# Patient Record
Sex: FEMALE | Race: WHITE | URBAN - METROPOLITAN AREA
[De-identification: names, ages, dates, MRNs, and addresses within clinical notes are randomized per-mention and may not be internally consistent; named-entity substitution may affect disease eponyms.]

---

## 2017-01-12 PROBLEM — E66.09 NON MORBID OBESITY DUE TO EXCESS CALORIES: Chronic | Status: ACTIVE | Noted: 2017-01-12

## 2017-01-16 ENCOUNTER — OFFICE VISIT (OUTPATIENT)
Dept: SLEEP MEDICINE | Facility: CLINIC | Age: 52
End: 2017-01-16
Payer: COMMERCIAL

## 2017-01-16 VITALS
TEMPERATURE: 98.6 F | SYSTOLIC BLOOD PRESSURE: 150 MMHG | HEART RATE: 104 BPM | WEIGHT: 231.6 LBS | OXYGEN SATURATION: 97 % | HEIGHT: 66 IN | DIASTOLIC BLOOD PRESSURE: 100 MMHG | BODY MASS INDEX: 37.22 KG/M2

## 2017-01-16 DIAGNOSIS — G47.9 DISTURBANCE IN SLEEP BEHAVIOR: ICD-10-CM

## 2017-01-16 DIAGNOSIS — R06.00 DYSPNEA AND RESPIRATORY ABNORMALITY: Primary | ICD-10-CM

## 2017-01-16 DIAGNOSIS — F51.04 PSYCHOPHYSIOLOGICAL INSOMNIA: ICD-10-CM

## 2017-01-16 DIAGNOSIS — R06.89 DYSPNEA AND RESPIRATORY ABNORMALITY: Primary | ICD-10-CM

## 2017-01-16 DIAGNOSIS — E66.09 NON MORBID OBESITY DUE TO EXCESS CALORIES: Chronic | ICD-10-CM

## 2017-01-16 DIAGNOSIS — R03.0 ELEVATED BLOOD PRESSURE READING WITHOUT DIAGNOSIS OF HYPERTENSION: ICD-10-CM

## 2017-01-16 PROCEDURE — 99204 OFFICE O/P NEW MOD 45 MIN: CPT | Performed by: INTERNAL MEDICINE

## 2017-01-16 RX ORDER — ZOLPIDEM TARTRATE 5 MG/1
TABLET ORAL
Qty: 1 TABLET | Refills: 0 | Status: SHIPPED | OUTPATIENT
Start: 2017-01-16 | End: 2017-02-08

## 2017-01-16 NOTE — PATIENT INSTRUCTIONS
Read the book Say Good Night To Insomnia     Your BMI is Body mass index is 37.4 kg/(m^2).  Weight management is a personal decision.  If you are interested in exploring weight loss strategies, the following discussion covers the approaches that may be successful. Body mass index (BMI) is one way to tell whether you are at a healthy weight, overweight, or obese. It measures your weight in relation to your height.  A BMI of 18.5 to 24.9 is in the healthy range. A person with a BMI of 25 to 29.9 is considered overweight, and someone with a BMI of 30 or greater is considered obese. More than two-thirds of American adults are considered overweight or obese.  Being overweight or obese increases the risk for further weight gain. Excess weight may lead to heart disease and diabetes.  Creating and following plans for healthy eating and physical activity may help you improve your health.  Weight control is part of healthy lifestyle and includes exercise, emotional health, and healthy eating habits. Careful eating habits lifelong are the mainstay of weight control. Though there are significant health benefits from weight loss, long-term weight loss with diet alone may be very difficult to achieve- studies show long-term success with dietary management in less than 10% of people. Attaining a healthy weight may be especially difficult to achieve in those with severe obesity. In some cases, medications, devices and surgical management might be considered.  What can you do?  If you are overweight or obese and are interested in methods for weight loss, you should discuss this with your provider.     Consider reducing daily calorie intake by 500 calories.     Keep a food journal.     Avoiding skipping meals, consider cutting portions instead.    Diet combined with exercise helps maintain muscle while optimizing fat loss. Strength training is particularly important for building and maintaining muscle mass. Exercise helps reduce  stress, increase energy, and improves fitness. Increasing exercise without diet control, however, may not burn enough calories to loose weight.       Start walking three days a week 10-20 minutes at a time    Work towards walking thirty minutes five days a week     Eventually, increase the speed of your walking for 1-2 minutes at time    In addition, we recommend that you review healthy lifestyles and methods for weight loss available through the National Institutes of Health patient information sites:  http://win.niddk.nih.gov/publications/index.htm    And look into health and wellness programs that may be available through your health insurance provider, employer, local community center, or myrna club.    Weight management plan: Patient was referred to their PCP to discuss a diet and exercise plan.

## 2017-01-16 NOTE — PROGRESS NOTES
Sleep Consultation:    Date on this visit: 1/16/2017    Mckenna Campbell  is referred by No ref. provider found for a sleep consultation.     Primary Physician: Linda Minaya     Chief Complaint   Patient presents with     Snoring     To see if I have sleep apnea , because I snore loud and it bothers my  and son        Mckenna goes to bed at 8:00 PM during the week. She gets up at 6:00 AM without an alarm. She falls asleep in 60 minutes.  Mckenna has difficulty falling asleep. This has been a problem for 4 years since she started working at [employer]. She takes melatonin.  She reads on nir. She is wide awake. She admits to troubles turning her mind off. She wakes up 2-3 times a night. She has trouble falling back to sleep once a night. She will again read. Mckenna wakes up to uncertain reasons and dreams.  On weekends, schedule is similar.  Patient gets an average of '4'  hours of sleep per night.     Patient does not watch TV in bed.     Mckenna does snore snoring is very loud. Patient does have a regular bed partner. She does have witnessed apneas. They occasionally sleep separately.  Patient sleeps on her side. She has occasional snort arousals, denies no morning headaches or restless legs.    Mckenna denies any sleep walking, sleep talking, dream enactment, sleep paralysis, cataplexy and hypnogogic/hypnopompic hallucinations.    Mckenna denies difficulty breathing through her nose.      Patient describes themself as a morning person. Patient's Raymond Sleepiness score 5/24 inconsistent with excessive daytime sleepiness.  She denies fatigue.     Mckenna naps 1 times per day for 60 minutes. She takes no inadvertant naps.  She denies dozing while driving. She uses 2-3 sodas/day. Last caffeine intake is usually before 6 pm.      Allergies:    Allergies   Allergen Reactions     Amoxicillin Anaphylaxis     Amoxicillin      Erythromycin      GI intolerance         Medications:    Current Outpatient Prescriptions    Medication Sig Dispense Refill     PRISTIQ 100 MG TB24 24 hr tablet        fenofibrate 160 MG tablet        VOLTAREN 1 % GEL        megestrol (MEGACE) 20 MG tablet        order for DME Equipment being ordered: tennis elbow strap 1 Device 0     diclofenac (VOLTAREN) 1 % GEL Apply 4 grams to knees or 2 grams to hands four times daily using enclosed dosing card. 100 g 1     cetirizine-psuedoePHEDrine (ZYRTEC-D) 5-120 MG per tablet Take 1 tablet by mouth 2 times daily 30 tablet 0     diclofenac (VOLTAREN) 1 % GEL Apply 4 grams to knees or 2 grams to hands four times daily using enclosed dosing card. 100 g 1     ibuprofen (ADVIL,MOTRIN) 200 MG tablet Take 2-3 tablets (400-600 mg) by mouth 3 times daily as needed for pain (take with food)       fenofibrate (TRIGLIDE) 160 MG tablet Take 160 mg by mouth daily. 1 tab QD         desvenlafaxine (PRISTIQ) 50 MG 24 hr tablet Take 50 mg by mouth daily. 1 tab QD         Problem List:  Patient Active Problem List    Diagnosis Date Noted     Elevated blood pressure reading without diagnosis of hypertension 03/30/2016     Priority: Medium     Hyperlipidemia LDL goal <130 02/10/2014     Priority: Medium     Non morbid obesity due to excess calories 01/12/2017     Priority: Low     CARDIOVASCULAR SCREENING; LDL GOAL LESS THAN 160 01/27/2016     Priority: Low     DJD (degenerative joint disease), lumbar 02/10/2014     Priority: Low        Past Medical/Surgical History:  Past Medical History   Diagnosis Date     Kidney stone 9/2015     Past Surgical History   Procedure Laterality Date     Cholecystectomy  3/2014     Lithotripsy  9/2015       Social History:  Social History     Social History     Marital Status:      Spouse Name: N/A     Number of Children: N/A     Years of Education: N/A     Occupational History     Customer service Unemployed     Social History Main Topics     Smoking status: Never Smoker      Smokeless tobacco: Not on file     Alcohol Use: 0.0 oz/week     0  "Standard drinks or equivalent per week      Comment: 1/year     Drug Use: No     Sexual Activity:     Partners: Male     Other Topics Concern     Not on file     Social History Narrative    ** Merged History Encounter **            Family History:  Family History   Problem Relation Age of Onset     Depression Mother      Hyperlipidemia Father      Hypertension Mother      Breast Cancer Other      Other Cancer Other      DIABETES No family hx of      Coronary Artery Disease No family hx of      Colon Cancer No family hx of        Review of Systems:  A complete review of systems reviewed by me is negative with the exeption of what has been mentioned in the history of present illness.  CONSTITUTIONAL:  NEGATIVE for  night sweats  EYES: NEGATIVE for changes in vision, blind spots, double vision.  ENT: NEGATIVE for ear pain, sore throat, sinus pain, post-nasal drip, runny nose, bloody nose  CARDIAC: NEGATIVE for fast heartbeats or fluttering in chest, chest pain or pressure, breathlessness when lying flat, swollen legs or swollen feet.  NEUROLOGIC: NEGATIVE headaches, weakness or numbness in the arms or legs.  DERMATOLOGIC: NEGATIVE for rashes, new moles or change in mole(s)  PULMONARY:  POSITIVE for  SOB with activity and dry cough  GASTROINTESTINAL: NEGATIVE for nausea or vomitting, loose or watery stools, fat or grease in stools, constipation, abdominal pain, bowel movements black in color or blood noted.  GENITOURINARY: NEGATIVE for pain during urination, blood in urine, urinating more frequently than usual, irregular menstrual periods.  MUSCULOSKELETAL:  POSITIVE for  bone or joint pain  ENDOCRINE: NEGATIVE for increased thirst or urination, diabetes.  LYMPHATIC: NEGATIVE for swollen lymph nodes, lumps or bumps in the breasts or nipple discharge.    Physical Examination:  Vitals: /100 mmHg  Pulse 104  Temp(Src) 98.6  F (37  C) (Oral)  Ht 1.676 m (5' 6\")  Wt 105.053 kg (231 lb 9.6 oz)  BMI 37.40 kg/m2  " SpO2 97%  BMI= Body mass index is 37.4 kg/(m^2).    Neck Cir (cm): 40 cm    Iselin Total Score 1/16/2017   Total score - Iselin 5       GENERAL APPEARANCE: alert and no distress  EYES: Eyes grossly normal to inspection and conjunctivae and sclerae normal  HENT: ear canals and TM's normal, nose and mouth without ulcers or lesions and oropharynx crowded  NECK: no adenopathy, no asymmetry, masses, or scars and thyroid normal to palpation  RESP: lungs clear to auscultation - no rales, rhonchi or wheezes  CV: regular rates and rhythm, normal S1 S2, no S3 or S4 and no murmur, click or rub  ABDOMEN: soft, nontender, without hepatosplenomegaly or masses  MS: extremities normal- no gross deformities noted  SKIN: no suspicious lesions or rashes  NEURO: Normal strength and tone, mentation intact, speech normal and cranial nerves 2-12 intact  PSYCH: mentation appears normal, affect normal/bright and anxious  Mallampati Class: II.  Tonsillar Stage: 1  hidden by pillars.    Impression/Plan:    Loud snoring, apneas, nocturnal awakenings/sleep maintenance difficulties, obesity, elevated blood pressure. Polysomnogram (using 4% desaturation/Medicare/2012 AASM 1B scoring rules) for moderate probability obstructive sleep apnea.  Ambien if needed. Patient is a poor candidate for Home Sleep Testing due to not high probability of severe JUSTIN and insomnia.    Sleep onset > maintenance difficulties. Probable Psychophysiologic insomnia. Read the book Say Good Night To Insomnia. We discussed stimulus control. Offered referral for cognitive behavioral training.       Patient to follow up with Primary Care provider regarding elevated blood pressure.    Literature provided regarding sleep apnea and insomnia.      She will follow up with me in approximately two weeks after her sleep study has been competed to review the results and discuss plan of care.       Polysomnography reviewed.  Obstructive sleep apnea reviewed.  Complications of  untreated sleep apnea were reviewed.    Joesph Vega       CC: Negro Pérez

## 2017-01-16 NOTE — MR AVS SNAPSHOT
After Visit Summary   1/16/2017    Mckenna Campbell    MRN: [id number]           Patient Information     Date Of Birth          1965        Visit Information        Provider Department      1/16/2017 7:00 AM Joesph Vega MD [facility]        Today's Diagnoses     Dyspnea and respiratory abnormality    -  1     Non morbid obesity due to excess calories         Elevated blood pressure reading without diagnosis of hypertension         Disturbance in sleep behavior         Psychophysiological insomnia           Care Instructions    Read the book Say Good Night To Insomnia     Your BMI is Body mass index is 37.4 kg/(m^2).  Weight management is a personal decision.  If you are interested in exploring weight loss strategies, the following discussion covers the approaches that may be successful. Body mass index (BMI) is one way to tell whether you are at a healthy weight, overweight, or obese. It measures your weight in relation to your height.  A BMI of 18.5 to 24.9 is in the healthy range. A person with a BMI of 25 to 29.9 is considered overweight, and someone with a BMI of 30 or greater is considered obese. More than two-thirds of American adults are considered overweight or obese.  Being overweight or obese increases the risk for further weight gain. Excess weight may lead to heart disease and diabetes.  Creating and following plans for healthy eating and physical activity may help you improve your health.  Weight control is part of healthy lifestyle and includes exercise, emotional health, and healthy eating habits. Careful eating habits lifelong are the mainstay of weight control. Though there are significant health benefits from weight loss, long-term weight loss with diet alone may be very difficult to achieve- studies show long-term success with dietary management in less than 10% of people. Attaining a healthy weight may be especially difficult to achieve in those with  severe obesity. In some cases, medications, devices and surgical management might be considered.  What can you do?  If you are overweight or obese and are interested in methods for weight loss, you should discuss this with your provider.     Consider reducing daily calorie intake by 500 calories.     Keep a food journal.     Avoiding skipping meals, consider cutting portions instead.    Diet combined with exercise helps maintain muscle while optimizing fat loss. Strength training is particularly important for building and maintaining muscle mass. Exercise helps reduce stress, increase energy, and improves fitness. Increasing exercise without diet control, however, may not burn enough calories to loose weight.       Start walking three days a week 10-20 minutes at a time    Work towards walking thirty minutes five days a week     Eventually, increase the speed of your walking for 1-2 minutes at time    In addition, we recommend that you review healthy lifestyles and methods for weight loss available through the National Institutes of Health patient information sites:  http://win.niddk.nih.gov/publications/index.htm    And look into health and wellness programs that may be available through your health insurance provider, employer, local community center, or myrna club.    Weight management plan: Patient was referred to their PCP to discuss a diet and exercise plan.            Follow-ups after your visit        Follow-up notes from your care team     Return in about 2 weeks (around 1/30/2017) for results.      Your next 10 appointments already scheduled     Jan 25, 2017  8:00 PM   PSG Split with BK BED 3   [facility] ([facility])    [address] [place] [id number]   [phone]            Feb 08, 2017  7:40 AM   Return Sleep Patient with Joesph Vega MD   [facility] ([facility])    [address] [place] [address]  "[address]  [address]  [facility] [address]   [phone]              Future tests that were ordered for you today     Open Future Orders        Priority Expected Expires Ordered    Comprehensive Sleep Study Routine  7/15/2017 1/16/2017            Who to contact     If you have questions or need follow up information about today's clinic visit or your schedule please contact [facility] directly at [phone].  Normal or non-critical lab and imaging results will be communicated to you by [employer], letter or phone within 4 business days after the clinic has received the results. If you do not hear from us within 7 days, please contact the clinic through [employer] or phone. If you have a critical or abnormal lab result, we will notify you by phone as soon as possible.  Submit refill requests through [employer] or call your pharmacy and they will forward the refill request to us. Please allow 3 business days for your refill to be completed.          Additional Information About Your Visit        [employer] Information     [employer] gives you secure access to your electronic health record. If you see a primary care provider, you can also send messages to your care team and make appointments. If you have questions, please call your primary care clinic.  If you do not have a primary care provider, please call [phone] and they will assist you.        Care EveryWhere ID     This is your Care EveryWhere ID. This could be used by other organizations to access your [facility] medical records  [id number]        Your Vitals Were     Pulse Temperature Height BMI (Body Mass Index) Pulse Oximetry       104 98.6  F (37  C) (Oral) 1.676 m (5' 6\") 37.40 kg/m2 97%        Blood Pressure from Last 3 Encounters:   01/16/17 150/100   03/30/16 145/90   01/27/16 136/84    Weight from Last 3 Encounters:   01/16/17 105.053 kg (231 lb 9.6 oz)   03/30/16 95.074 kg (209 lb 9.6 oz)   01/27/16 96.616 kg (213 lb)               "   Today's Medication Changes          These changes are accurate as of: 1/16/17  8:01 AM.  If you have any questions, ask your nurse or doctor.               Start taking these medicines.        Dose/Directions    zolpidem 5 MG tablet   Commonly known as:  AMBIEN   Used for:  Non morbid obesity due to excess calories, Elevated blood pressure reading without diagnosis of hypertension, Disturbance in sleep behavior, Dyspnea and respiratory abnormality   Started by:  Joesph Vega MD        Take tablet by mouth 15 minutes prior to sleep, for Sleep Study   Quantity:  1 tablet   Refills:  0            Where to get your medicines      Some of these will need a paper prescription and others can be bought over the counter.  Ask your nurse if you have questions.     Bring a paper prescription for each of these medications    - zolpidem 5 MG tablet             Primary Care Provider Office Phone # Fax #    Negro Pérez [phone]       [facility] [address]  [facility] [address]        Thank you!     Thank you for choosing [facility]  for your care. Our goal is always to provide you with excellent care. Hearing back from our patients is one way we can continue to improve our services. Please take a few minutes to complete the written survey that you may receive in the mail after your visit with us. Thank you!             Your Updated Medication List - Protect others around you: Learn how to safely use, store and throw away your medicines at www.disposemymeds.org.          This list is accurate as of: 1/16/17  8:01 AM.  Always use your most recent med list.                   Brand Name Dispense Instructions for use    cetirizine-psuedoePHEDrine 5-120 MG per 12 hr tablet    zyrTEC-D    30 tablet    Take 1 tablet by mouth 2 times daily       * diclofenac 1 % Gel topical gel    VOLTAREN    100 g    Apply 4 grams to knees or 2 grams to hands four times daily using enclosed dosing  card.       * diclofenac 1 % Gel topical gel    VOLTAREN    100 g    Apply 4 grams to knees or 2 grams to hands four times daily using enclosed dosing card.       * VOLTAREN 1 % Gel topical gel   Generic drug:  diclofenac          ibuprofen 200 MG tablet    ADVIL/MOTRIN     Take 2-3 tablets (400-600 mg) by mouth 3 times daily as needed for pain (take with food)       megestrol 20 MG tablet    MEGACE         order for DME     1 Device    Equipment being ordered: tennis elbow strap       * PRISTIQ 50 MG 24 hr tablet   Generic drug:  desvenlafaxine succinate ER      Take 50 mg by mouth daily. 1 tab QD       * PRISTIQ 100 MG 24 hr tablet   Generic drug:  desvenlafaxine succinate ER          * TRIGLIDE 160 MG tablet   Generic drug:  fenofibrate      Take 160 mg by mouth daily. 1 tab QD       * fenofibrate 160 MG tablet          zolpidem 5 MG tablet    AMBIEN    1 tablet    Take tablet by mouth 15 minutes prior to sleep, for Sleep Study       * Notice:  This list has 7 medication(s) that are the same as other medications prescribed for you. Read the directions carefully, and ask your doctor or other care provider to review them with you.

## 2017-01-25 ENCOUNTER — THERAPY VISIT (OUTPATIENT)
Dept: SLEEP MEDICINE | Facility: CLINIC | Age: 52
End: 2017-01-25
Payer: COMMERCIAL

## 2017-01-25 DIAGNOSIS — E66.09 NON MORBID OBESITY DUE TO EXCESS CALORIES: Chronic | ICD-10-CM

## 2017-01-25 DIAGNOSIS — R06.00 DYSPNEA AND RESPIRATORY ABNORMALITY: ICD-10-CM

## 2017-01-25 DIAGNOSIS — G47.9 DISTURBANCE IN SLEEP BEHAVIOR: ICD-10-CM

## 2017-01-25 DIAGNOSIS — R06.89 DYSPNEA AND RESPIRATORY ABNORMALITY: ICD-10-CM

## 2017-01-25 DIAGNOSIS — R03.0 ELEVATED BLOOD PRESSURE READING WITHOUT DIAGNOSIS OF HYPERTENSION: ICD-10-CM

## 2017-01-25 PROCEDURE — 95810 POLYSOM 6/> YRS 4/> PARAM: CPT | Performed by: INTERNAL MEDICINE

## 2017-01-26 NOTE — PROGRESS NOTES
[facility] at  [facility].    Diagnostic PSG completed per provider order.  Patient did not meet criteria for PAP therapy.

## 2017-01-26 NOTE — PROCEDURES
" SLEEP STUDY INTERPRETATION  POLYSOMNOGRAPHY REPORT      Patient: Mckenna Weir  YOB: 1965  Study Date: 1/25/2017  MRN: [id number]  Referring Provider: none  Ordering Provider: Joesph Vega MD    Indications for Polysomnography: The patient is a 51 y old Female who is 5' 6\" and weighs 231.0 lbs.  Her BMI is 37.6, [place] sleepiness scale 5.0 and neck size is 40.0.  A diagnostic polysomnogram was performed to evaluate for Loud snoring, apneas, nocturnal awakenings/sleep maintenance difficulties, obesity, elevated blood pressure    Polysomnogram Data:  A full night polysomnogram recorded the standard physiologic parameters including EEG, EOG, EMG, ECG, nasal and oral airflow.  Respiratory parameters of chest and abdominal movements were recorded with respiratory inductance plethysmography.  Oxygen saturation was recorded by pulse oximetry.      Sleep Architecture:   The total recording time of the polysomnogram was 526.6 minutes.  The total sleep time was 382.5 minutes.  Sleep latency was increased at 45.2 minutes with the use of a sleep aid.  REM latency was 310.0 minutes.  Arousal index was normal/increased at 19.1 arousals per hour.  Sleep efficiency was decreased at 72.6%.  Wake after sleep onset was 95.0 minutes.  The patient spent 8.0% of total sleep time in Stage N1, 48.8% in Stage N2, 22.9% in Stages N3, and 20.4% in REM.  Time in REM supine was 0 minutes.    Respiration:     Events - The polysomnogram revealed a presence of 0 obstructive, 5 central, and 0 mixed apneas resulting in an apnea index of 0.8 events per hour.  There were 5 hypopneas resulting in a hypopnea index of 0.8 events per hour.  The combined apnea/hypopnea index was 1.6 events per hour.  The REM AHI was 6.2 events per hour.  The supine AHI was 0.5 events per hour.  The RERA index was 6.0 events per hour.   The RDI was 7.6 events per hour.    Snoring - was reported as mild to moderate.    Respiratory rate and pattern - " was notable for normal respiratory rate and pattern.    Sustained Sleep Associated Hypoventilation - Transcutaneous carbon dioxide monitoring was not used    Sleep Associated Hypoxemia - (Greater than 5 minutes O2 sat below 89%) was not present.  Baseline oxygen saturation was 95.9%. Lowest oxygen saturation was 86.9%.    7.6 6.0 1.6     Movement Activity:     Periodic Limb Activity - There were 53 PLMs during the entire study. The PLM index was 8.3 movements per hour.  The PLM Arousal Index was 2.4 per hour.    REM EMG Activity - Excessive transient / sustained muscle activity was not present.    Nocturnal Behavior - Abnormal sleep related behaviors were not noted     Bruxism - None apparent.    Cardiac Summary:   The average pulse rate was 81.4 bpm.  The minimum pulse rate was 60.8 bpm while the maximum pulse rate was 109.5 bpm. The rhythm is normal sinus. Arrhythmias were not noted.          Assessment:     No significant obstructive sleep apnea    Periodic limb movements of sleep    Recommendations:    Suggest optimizing sleep schedule.    Weight management (if BMI > 30).    Pharmacologic therapy should be used for management of restless legs syndrome only if present and clinically indicated and not based on the presence of periodic limb movements alone.        _____________________________________   Joesph Svee, MD             Range(%) Time in range (min) Time in range (%) Time in or below range (min) Time in or below range (%)   0.0 - 89.0 0.1 0.0% 0.1 0.0%   0.0 - 88.0 0.0 0.0% 0.0 0.0%      1.6 0.5 6.2

## 2017-02-08 ENCOUNTER — OFFICE VISIT (OUTPATIENT)
Dept: SLEEP MEDICINE | Facility: CLINIC | Age: 52
End: 2017-02-08
Payer: COMMERCIAL

## 2017-02-08 VITALS
OXYGEN SATURATION: 98 % | WEIGHT: 229.2 LBS | SYSTOLIC BLOOD PRESSURE: 147 MMHG | BODY MASS INDEX: 36.83 KG/M2 | HEIGHT: 66 IN | DIASTOLIC BLOOD PRESSURE: 98 MMHG | HEART RATE: 105 BPM

## 2017-02-08 DIAGNOSIS — F51.04 PSYCHOPHYSIOLOGICAL INSOMNIA: Primary | ICD-10-CM

## 2017-02-08 PROCEDURE — 99213 OFFICE O/P EST LOW 20 MIN: CPT | Performed by: INTERNAL MEDICINE

## 2017-02-08 NOTE — PATIENT INSTRUCTIONS
Read the book Say Good Night To Insomnia     Your BMI is Body mass index is 37.01 kg/(m^2).  Weight management is a personal decision.  If you are interested in exploring weight loss strategies, the following discussion covers the approaches that may be successful. Body mass index (BMI) is one way to tell whether you are at a healthy weight, overweight, or obese. It measures your weight in relation to your height.  A BMI of 18.5 to 24.9 is in the healthy range. A person with a BMI of 25 to 29.9 is considered overweight, and someone with a BMI of 30 or greater is considered obese. More than two-thirds of American adults are considered overweight or obese.  Being overweight or obese increases the risk for further weight gain. Excess weight may lead to heart disease and diabetes.  Creating and following plans for healthy eating and physical activity may help you improve your health.  Weight control is part of healthy lifestyle and includes exercise, emotional health, and healthy eating habits. Careful eating habits lifelong are the mainstay of weight control. Though there are significant health benefits from weight loss, long-term weight loss with diet alone may be very difficult to achieve- studies show long-term success with dietary management in less than 10% of people. Attaining a healthy weight may be especially difficult to achieve in those with severe obesity. In some cases, medications, devices and surgical management might be considered.  What can you do?  If you are overweight or obese and are interested in methods for weight loss, you should discuss this with your provider.     Consider reducing daily calorie intake by 500 calories.     Keep a food journal.     Avoiding skipping meals, consider cutting portions instead.    Diet combined with exercise helps maintain muscle while optimizing fat loss. Strength training is particularly important for building and maintaining muscle mass. Exercise helps reduce  stress, increase energy, and improves fitness. Increasing exercise without diet control, however, may not burn enough calories to loose weight.       Start walking three days a week 10-20 minutes at a time    Work towards walking thirty minutes five days a week     Eventually, increase the speed of your walking for 1-2 minutes at time    In addition, we recommend that you review healthy lifestyles and methods for weight loss available through the National Institutes of Health patient information sites:  http://win.niddk.nih.gov/publications/index.htm    And look into health and wellness programs that may be available through your health insurance provider, employer, local community center, or [employer] club.    Weight management plan: Patient was referred to their PCP to discuss a diet and exercise plan.

## 2017-02-08 NOTE — PROGRESS NOTES
Sleep Study Follow-Up Visit:    Date on this visit: 2/8/2017    Mckenna Campbell comes in today for follow-up of her sleep study done on 1/25/16 at the [facility] [place] for loud snoring, apneas, nocturnal awakenings/sleep maintenance difficulties, obesity, elevated blood pressure. Sleep onset > maintenance difficulties, probable psychophysiologic insomnia .      Study Date: 1/25/2017  Sleep Architecture:    The total recording time of the polysomnogram was 526.6 minutes.  The total sleep time was 382.5 minutes.  Sleep latency was increased at 45.2 minutes with the use of a sleep aid.  REM latency was 310.0 minutes.  Arousal index was normal/increased at 19.1 arousals per hour.  Sleep efficiency was decreased at 72.6%.  Wake after sleep onset was 95.0 minutes.  The patient spent 8.0% of total sleep time in Stage N1, 48.8% in Stage N2, 22.9% in Stages N3, and 20.4% in REM.  Time in REM supine was 0 minutes.    Respiration:      Events - The polysomnogram revealed a presence of 0 obstructive, 5 central, and 0 mixed apneas resulting in an apnea index of 0.8 events per hour.  There were 5 hypopneas resulting in a hypopnea index of 0.8 events per hour.  The combined apnea/hypopnea index was 1.6 events per hour.  The REM AHI was 6.2 events per hour. The supine AHI was 0.5 events per hour.  The RERA index was 6.0 events per hour.   The RDI was 7.6 events per hour.    Snoring - was reported as mild to moderate.    Respiratory rate and pattern - was notable for normal respiratory rate and pattern.    Sustained Sleep Associated Hypoventilation - Transcutaneous carbon dioxide monitoring was not used    Sleep Associated Hypoxemia - (Greater than 5 minutes O2 sat below 89%) was not present.  Baseline oxygen saturation was 95.9%. Lowest oxygen saturation was 86.9%.       Movement Activity:     Periodic Limb Activity - There were 53 PLMs during the entire study. The PLM index was 8.3 movements per hour.   The PLM Arousal Index was 2.4 per hour.    REM EMG Activity - Excessive transient / sustained muscle activity was not present.    Nocturnal Behavior - Abnormal sleep related behaviors were not noted      Bruxism - None apparent.    Cardiac Summary:   The average pulse rate was 81.4 bpm.  The minimum pulse rate was 60.8 bpm while the maximum pulse rate was 109.5 bpm. The rhythm is normal sinus. Arrhythmias were not noted.            These findings were reviewed with patient.     Past medical/surgical history, family history, social history, medications and allergies were reviewed.      Problem List:  Patient Active Problem List    Diagnosis Date Noted     Elevated blood pressure reading without diagnosis of hypertension 03/30/2016     Priority: Medium     CARDIOVASCULAR SCREENING; LDL GOAL LESS THAN 160 01/27/2016     Priority: Medium     Hyperlipidemia LDL goal <130 02/10/2014     Priority: Medium     DJD (degenerative joint disease), lumbar 02/10/2014     Priority: Medium     Non morbid obesity due to excess calories 01/12/2017     Priority: Low        Impression/Plan:    No evidence of obstructive sleep apnea    Psychophysiologic insomnia. Has not yet read Read the book Say Good Night To Insomnia      She will follow up with me as needed.     Fifteen minutes spent with patient, all of which were spent face-to-face counseling, consulting, coordinating plan of care.      Joesph Vega

## 2017-02-08 NOTE — NURSING NOTE
"Chief Complaint   Patient presents with     RECHECK     results       Initial /98 mmHg  Pulse 105  Ht 1.676 m (5' 6\")  Wt 103.964 kg (229 lb 3.2 oz)  BMI 37.01 kg/m2  SpO2 98% Estimated body mass index is 37.01 kg/(m^2) as calculated from the following:    Height as of this encounter: 1.676 m (5' 6\").    Weight as of this encounter: 103.964 kg (229 lb 3.2 oz).  Medication Reconciliation: complete   Marcela Almeida, ESTEFANI      "

## 2017-02-08 NOTE — MR AVS SNAPSHOT
After Visit Summary   2/8/2017    Mckenna Campbell    MRN: [id number]           Patient Information     Date Of Birth          1965        Visit Information        Provider Department      2/8/2017 7:40 AM Joesph Vega MD [facility]        Today's Diagnoses     Psychophysiological insomnia    -  1       Care Instructions    Read the book Say Good Night To Insomnia     Your BMI is Body mass index is 37.01 kg/(m^2).  Weight management is a personal decision.  If you are interested in exploring weight loss strategies, the following discussion covers the approaches that may be successful. Body mass index (BMI) is one way to tell whether you are at a healthy weight, overweight, or obese. It measures your weight in relation to your height.  A BMI of 18.5 to 24.9 is in the healthy range. A person with a BMI of 25 to 29.9 is considered overweight, and someone with a BMI of 30 or greater is considered obese. More than two-thirds of American adults are considered overweight or obese.  Being overweight or obese increases the risk for further weight gain. Excess weight may lead to heart disease and diabetes.  Creating and following plans for healthy eating and physical activity may help you improve your health.  Weight control is part of healthy lifestyle and includes exercise, emotional health, and healthy eating habits. Careful eating habits lifelong are the mainstay of weight control. Though there are significant health benefits from weight loss, long-term weight loss with diet alone may be very difficult to achieve- studies show long-term success with dietary management in less than 10% of people. Attaining a healthy weight may be especially difficult to achieve in those with severe obesity. In some cases, medications, devices and surgical management might be considered.  What can you do?  If you are overweight or obese and are interested in methods for weight loss, you should  discuss this with your provider.     Consider reducing daily calorie intake by 500 calories.     Keep a food journal.     Avoiding skipping meals, consider cutting portions instead.    Diet combined with exercise helps maintain muscle while optimizing fat loss. Strength training is particularly important for building and maintaining muscle mass. Exercise helps reduce stress, increase energy, and improves fitness. Increasing exercise without diet control, however, may not burn enough calories to loose weight.       Start walking three days a week 10-20 minutes at a time    Work towards walking thirty minutes five days a week     Eventually, increase the speed of your walking for 1-2 minutes at time    In addition, we recommend that you review healthy lifestyles and methods for weight loss available through the National Institutes of Health patient information sites:  http://win.niddk.nih.gov/publications/index.htm    And look into health and wellness programs that may be available through your health insurance provider, employer, local community center, or [employer] club.    Weight management plan: Patient was referred to their PCP to discuss a diet and exercise plan.            Follow-ups after your visit        Follow-up notes from your care team     Return in about 1 day (around 2/9/2017), or if symptoms worsen or fail to improve.      Who to contact     If you have questions or need follow up information about today's clinic visit or your schedule please contact [facility] directly at [phone].  Normal or non-critical lab and imaging results will be communicated to you by MyChart, letter or phone within 4 business days after the clinic has received the results. If you do not hear from us within 7 days, please contact the clinic through MyChart or phone. If you have a critical or abnormal lab result, we will notify you by phone as soon as possible.  Submit refill requests through [employer] or call  "your pharmacy and they will forward the refill request to us. Please allow 3 business days for your refill to be completed.          Additional Information About Your Visit        [employer] Information     [employer] gives you secure access to your electronic health record. If you see a primary care provider, you can also send messages to your care team and make appointments. If you have questions, please call your primary care clinic.  If you do not have a primary care provider, please call [phone] and they will assist you.        Care EveryWhere ID     This is your Care EveryWhere ID. This could be used by other organizations to access your [place] medical records  [id number]        Your Vitals Were     Pulse Height BMI (Body Mass Index) Pulse Oximetry          105 1.676 m (5' 6\") 37.01 kg/m2 98%         Blood Pressure from Last 3 Encounters:   02/08/17 147/98   01/16/17 150/100   03/30/16 145/90    Weight from Last 3 Encounters:   02/08/17 103.964 kg (229 lb 3.2 oz)   01/16/17 105.053 kg (231 lb 9.6 oz)   03/30/16 95.074 kg (209 lb 9.6 oz)              Today, you had the following     No orders found for display       Primary Care Provider Office Phone # Fax #    Negro Pérez [phone]       [facility] [address] [place] [facility] [address]        Thank you!     Thank you for choosing [facility]  for your care. Our goal is always to provide you with excellent care. Hearing back from our patients is one way we can continue to improve our services. Please take a few minutes to complete the written survey that you may receive in the mail after your visit with us. Thank you!             Your Updated Medication List - Protect others around you: Learn how to safely use, store and throw away your medicines at www.disposemymeds.org.          This list is accurate as of: 2/8/17  8:06 AM.  Always use your most recent med list.                   Brand Name Dispense " Instructions for use    diclofenac 1 % Gel topical gel    VOLTAREN    100 g    Apply 4 grams to knees or 2 grams to hands four times daily using enclosed dosing card.       ibuprofen 200 MG tablet    ADVIL/MOTRIN     Take 2-3 tablets (400-600 mg) by mouth 3 times daily as needed for pain (take with food)       megestrol 20 MG tablet    MEGACE         order for DME     1 Device    Equipment being ordered: tennis elbow strap       PRISTIQ 100 MG 24 hr tablet   Generic drug:  desvenlafaxine succinate ER          TRIGLIDE 160 MG tablet   Generic drug:  fenofibrate      Take 160 mg by mouth daily. 1 tab QD

## 2017-03-15 ENCOUNTER — MYC MEDICAL ADVICE (OUTPATIENT)
Dept: FAMILY MEDICINE | Facility: CLINIC | Age: 52
End: 2017-03-15

## 2017-03-15 NOTE — TELEPHONE ENCOUNTER
Panel Management Review          Composite cancer screening  Chart review shows that this patient is due/due soon for the following Pap Smear, Mammogram and Colonoscopy  Summary:    Patient is due/failing the following:   Lipids, Hep C, COLONOSCOPY, MAMMOGRAM and PAP    Action needed:   Patient needs office visit for physical with pap; schedule mammo/colon.    Type of outreach:    Sent [employer] message.    Questions for provider review:    None                                                                                                                                    Tamia Bailey MA  11:49 AM 3/15/2017       Chart routed to none .

## 2017-03-27 ENCOUNTER — OFFICE VISIT (OUTPATIENT)
Dept: ORTHOPEDICS | Facility: CLINIC | Age: 52
End: 2017-03-27
Payer: COMMERCIAL

## 2017-03-27 ENCOUNTER — RADIANT APPOINTMENT (OUTPATIENT)
Dept: GENERAL RADIOLOGY | Facility: CLINIC | Age: 52
End: 2017-03-27
Attending: ORTHOPAEDIC SURGERY
Payer: COMMERCIAL

## 2017-03-27 VITALS — RESPIRATION RATE: 18 BRPM | HEIGHT: 66 IN | WEIGHT: 230 LBS | BODY MASS INDEX: 36.96 KG/M2

## 2017-03-27 DIAGNOSIS — M22.42 CHONDROMALACIA PATELLAE, LEFT: ICD-10-CM

## 2017-03-27 DIAGNOSIS — M25.562 LEFT KNEE PAIN, UNSPECIFIED CHRONICITY: ICD-10-CM

## 2017-03-27 DIAGNOSIS — M25.562 LEFT KNEE PAIN, UNSPECIFIED CHRONICITY: Primary | ICD-10-CM

## 2017-03-27 PROCEDURE — 73562 X-RAY EXAM OF KNEE 3: CPT | Mod: LT

## 2017-03-27 PROCEDURE — 99214 OFFICE O/P EST MOD 30 MIN: CPT | Performed by: ORTHOPAEDIC SURGERY

## 2017-03-27 NOTE — PATIENT INSTRUCTIONS
Diagnosis  Chondromalacia patella.  Glucosamine 1500 mg/day and chondroitin sulfate 1200 mg/day advised.  Avoid kneeling and crawling.  Do quadriceps strengthening (especially VMO) .  Do hip abduction strengthening.  Use anti-inflammatories as needed.                    Patellofemoral Pain Syndrome (Runner's Knee)             What is patellofemoral pain syndrome?   Patellofemoral pain syndrome is pain behind the kneecap. It may also be called patellofemoral disorder, patellar malalignment, runner's knee, and chondromalacia.   How does it occur?   Patellofemoral pain syndrome can occur from overuse of the knee in sports and activities such as running, walking, jumping, or bicycling.   The kneecap (patella) is attached to the large group of muscles in the thigh called the quadriceps. It is also attached to the shin bone by the patellar tendon. The kneecap fits into grooves in the end of the thigh bone (femur) called the femoral condyle. With repeated bending and straightening of the knee, you can irritate the inside surface of the kneecap and cause pain.   Patellofemoral pain syndrome also may result from the way your hips, legs, knees, or feet are aligned. For example, if you have wide hips or underdeveloped thigh muscles, or if you are knock-kneed You may also have this problem if your foot flattens too much when you walk or run (a condition called over-pronation).   What are the symptoms?   The main symptom is pain behind the kneecap. You may have pain when you walk, run, or sit for a long time. The pain is usually worse when you walk downhill or down stairs. Your knee may swell at times. You may feel or hear snapping, popping, or grinding in the knee.   How is it diagnosed?   Your healthcare provider will review your symptoms and examine your knee. You will have knee X-rays. You may have an MRI to check for damage to the surface of the patella or femur or another injury.   How is it treated?   Treatment includes  the following:   Put an ice pack, gel pack, or package of frozen vegetables, wrapped in a cloth on the area every 3 to 4 hours, for up to 20 minutes at a time.   Raise the knee on a pillow when you sit or lie down.   Take an anti-inflammatory medicine such as ibuprofen, or other medicine as directed by your provider. Nonsteroidal anti-inflammatory medicines (NSAIDs) may cause stomach bleeding and other problems. These risks increase with age. Read the label and take as directed. Unless recommended by your healthcare provider, do not take for more than 10 days.   Follow your provider's instructions for doing exercises to help you recover. Your healthcare provider will show you exercises to help decrease the pain behind your kneecap.   If you over-pronate, your healthcare provider may recommend shoe inserts, called orthotics. You can buy orthotics at a pharmacy or athletic shoe store or they can be custom-made.   Use an infrapatellar strap, a strap placed below the kneecap over the patellar tendon.   Wear a neoprene knee sleeve, which will give support to your knee and patella.   While you recover from your injury, you will need to change your sport or activity to one that does not make your condition worse. For example, you may need to bicycle or swim instead of run.   In cases of severe patellofemoral pain syndrome, surgery may be recommended.   How long will the effects last?   Patellofemoral pain often lasts a long time and can come back after symptoms were better for a while. Treatment requires proper rehabilitation exercises that are done regularly.   When can I return to my normal activities?   Everyone recovers from an injury at a different rate. Return to your activities depends on how soon your knee recovers, not by how many days or weeks it has been since your injury has occurred. In general, the longer you have symptoms before you start treatment, the longer it will take to get better. The goal is to  return you to your normal activities as soon as is safely possible. If you return too soon you may worsen your injury.   You may safely return to your normal activities when, starting from the top of the list and progressing to the end, each of the following is true:   Your injured knee can be fully straightened and bent without pain.   Your knee and leg have regained normal strength compared to the uninjured knee and leg.   You are able to walk, bend, and squat without pain.   How can I prevent runner's knee?   Runner's knee can best be prevented by strengthening your thigh muscles, particularly the inside part of this muscle group. It is also important to wear shoes that fit well and that have good arch supports.     Published by [employer].  This content is reviewed periodically and is subject to change as new health information becomes available. The information is intended to inform and educate and is not a replacement for medical evaluation, advice, diagnosis or treatment by a healthcare professional.   Written by Moshe Carpenter MD, for [employer]   ? 2010 [employer] and/or its affiliates. All Rights Reserved.   Copyright   [employer] 2011  Adult Health Advisor    Patellofemoral Pain Syndrome (Runner's Knee) Rehabilitation Exercises              You can do the hamstring stretch right away. When the pain in your knee has decreased, you can do the quadriceps stretch and start strengthening the thigh muscles using the rest of the exercises.   Standing hamstring stretch: Put the heel of the leg on your injured side on a stool about 15 inches high. Keep your leg straight. Lean forward, bending at the hips, until you feel a mild stretch in the back of your thigh. Make sure you don't roll your shoulders or bend at the waist when doing this or you will stretch your lower back instead of your leg. Hold the stretch for 15 to 30 seconds. Repeat 3 times.   Quadriceps stretch: Stand an arm's length away  from the wall with your injured leg farthest from the wall. Facing straight ahead, brace yourself by keeping one hand against the wall. With your other hand, grasp the ankle of your injured leg and pull your heel toward your buttocks. Don't arch or twist your back. Keep your knees together. Hold this stretch for 15 to 30 seconds.   Side-lying leg lift: Lie on your uninjured side. Tighten the front thigh muscles on your injured leg and lift that leg 8 to 10 inches away from the other leg. Keep the leg straight and lower it slowly. Do 3 sets of 10.   Quad sets: Sit on the floor with your injured leg straight and your other leg bent. Press the back of the knee of your injured leg against the floor by tightening the muscles on the top of your thigh. Hold this position 10 seconds. Relax. Do 3 sets of 10.   Straight leg raise: Lie on your back with your legs straight out in front of you. Bend the knee on your uninjured side and place the foot flat on the floor. Tighten the thigh muscle on your injured side and lift your leg about 8 inches off the floor. Keep your leg straight and your thigh muscle tight. Slowly lower your leg back down to the floor. Do 3 sets of 10.   Step-up: Stand with the foot of your injured leg on a support 3 to 5 inches high (like a small step or block of wood). Keep your other foot flat on the floor. Shift your weight onto the injured leg on the support. Straighten your injured leg as the other leg comes off the floor. Return to the starting position by bending your injured leg and slowly lowering your uninjured leg back to the floor. Do 3 sets of 10.   Wall squat with a ball: Stand with your back, shoulders, and head against a wall. Look straight ahead. Keep your shoulders relaxed and your feet 3 feet from the wall and shoulder's width apart. Place a soccer or basketball-sized ball behind your back. Keeping your back against the wall, slowly squat down to a 45-degree angle. Your thighs will not  yet be parallel to the floor. Hold this position for 10 seconds and then slowly slide back up the wall. Repeat 10 times. Build up to 3 sets of 10.   Knee stabilization: Wrap a piece of elastic tubing around the ankle of the uninjured leg. Tie a knot in the other end of the tubing and close it in a door.   0. Stand facing the door on the leg without tubing and bend your knee slightly, keeping your thigh muscles tight. While maintaining this position, move the leg with the tubing straight back behind you. Do 3 sets of 10.   0. Turn 90 degrees so the leg without tubing is closest to the door. Move the leg with tubing away from your body. Do 3 sets of 10.   0. Turn 90 degrees again so your back is to the door. Move the leg with tubing straight out in front of you. Do 3 sets of 10.   0. Turn your body 90 degrees again so the leg with tubing is closest to the door. Move the leg with tubing across your body. Do 3 sets of 10.   Hold onto a chair if you need help balancing. This exercise can be made even more challenging by standing on a pillow while you move the leg with tubing.   Resisted terminal knee extension: Make a loop from a piece of elastic tubing by tying a knot in both ends. Close both knots in a door. Step into the loop so the tubing is around the back of your injured leg. Lift the other foot off the ground. Hold onto a chair for balance, if needed. Bend the knee on the leg with tubing about 45 degrees. Slowly straighten your leg, keeping your thigh muscle tight as you do this. Do this 10 times. Do 3 sets. An easier way to do this is to stand on both legs for better support while you do the exercise.   Standing calf stretch: Stand facing a wall with your hands on the wall at about eye level. Keep your injured leg back with your heel on the floor. Keep the other leg forward with the knee bent. Turn your back foot slightly inward (as if you were pigeon-toed). Slowly lean into the wall until you feel a stretch in  the back of your calf. Hold the stretch for 15 to 30 seconds. Return to the starting position. Repeat 3 times. Do this exercise several times each day.   Clam exercise: Lie on your uninjured side with your hips and knees bent and feet together. Slowly raise your top leg toward the ceiling while keeping your heels touching each other. Hold for 2 seconds and lower slowly. Do 3 sets of 10 repetitions.   Iliotibial band stretch: Side-bending: Cross one leg in front of the other leg and lean in the opposite direction from the front leg. Reach the arm on the side of the back leg over your head while you do this. Hold this position for 15 to 30 seconds. Return to the starting position. Repeat 3 times and then switch legs and repeat the exercise.   Published by [employer].  This content is reviewed periodically and is subject to change as new health information becomes available. The information is intended to inform and educate and is not a replacement for medical evaluation, advice, diagnosis or treatment by a healthcare professional.   Written by Melanie Talbot, MS, PT, and Aleisha Douglas PT, [facility], hospitals, for [employer].   ? 2010 [facility] and/or its affiliates. All Rights Reserved.   Copyright   [employer] 2011  Adult Health Advisor

## 2017-03-27 NOTE — NURSING NOTE
"Chief Complaint   Patient presents with     RECHECK     Left knee pain for the last 2 yrs. no injury. Pain level 6/10 sharp, shooting and occasional. Sitting makes the pain better and standing and walking makes the pain worse.       Initial Resp 18  Ht 1.676 m (5' 6\")  Wt 104.3 kg (230 lb)  BMI 37.12 kg/m2 Estimated body mass index is 37.12 kg/(m^2) as calculated from the following:    Height as of this encounter: 1.676 m (5' 6\").    Weight as of this encounter: 104.3 kg (230 lb).  Medication Reconciliation: complete   Liss Carey MA      "

## 2017-03-27 NOTE — LETTER
3/27/2017       RE: Mckenna Campbell  [address]  [facility], [place] [address]           Dear Colleague,    Thank you for referring your patient, Mckenna Campbell, to the [facility]. Please see a copy of my visit note below.    Mckenna Campbell is a 51 year old female who is seen as self referral for left knee pain.    Past Medical History:   Diagnosis Date     Kidney stone 9/2015       Past Surgical History:   Procedure Laterality Date     CHOLECYSTECTOMY  3/2014     LITHOTRIPSY  9/2015       Family History   Problem Relation Age of Onset     Depression Mother      Hypertension Mother      Hyperlipidemia Father      Breast Cancer Other      Other Cancer Other      DIABETES No family hx of      Coronary Artery Disease No family hx of      Colon Cancer No family hx of        Social History     Social History     Marital status:      Spouse name: N/A     Number of children: N/A     Years of education: N/A     Occupational History     Customer service Unemployed     Social History Main Topics     Smoking status: Never Smoker     Smokeless tobacco: Not on file     Alcohol use 0.0 oz/week     0 Standard drinks or equivalent per week      Comment: 1/year     Drug use: No     Sexual activity: Yes     Partners: Male     Other Topics Concern     Not on file     Social History Narrative    ** Merged History Encounter **            Current Outpatient Prescriptions   Medication Sig Dispense Refill     PRISTIQ 100 MG TB24 24 hr tablet        megestrol (MEGACE) 20 MG tablet        order for DME Equipment being ordered: tennis elbow strap 1 Device 0     diclofenac (VOLTAREN) 1 % GEL Apply 4 grams to knees or 2 grams to hands four times daily using enclosed dosing card. 100 g 1     ibuprofen (ADVIL,MOTRIN) 200 MG tablet Take 2-3 tablets (400-600 mg) by mouth 3 times daily as needed for pain (take with food)       fenofibrate (TRIGLIDE) 160 MG tablet Take 160 mg by mouth daily. 1 tab QD      "      Allergies   Allergen Reactions     Amoxicillin Anaphylaxis     Amoxicillin      Erythromycin      GI intolerance         REVIEW OF SYSTEMS:  CONSTITUTIONAL:  NEGATIVE for fever, chills, change in weight, not feeling tired  SKIN:  NEGATIVE for worrisome rashes, no skin lumps, no skin ulcers and no non-healing wounds  EYES:  NEGATIVE for vision changes or irritation.  ENT/MOUTH:  NEGATIVE.  No hearing loss, no hoarseness, no difficulty swallowing.  RESP:  NEGATIVE. No cough or shortness of breath.  CV:  NEGATIVE for chest pain, palpitations or peripheral edema  GI:  NEGATIVE for nausea, abdominal pain, heartburn, or change in bowel habits  :  Negative. No dysuria, no hematuria  MUSCULOSKELETAL:  See HPI above  NEURO:  NEGATIVE . No headaches, no dizziness,  no numbness  ENDOCRINE:  NEGATIVE for temperature intolerance, skin/hair changes  HEME/ALLERGY/IMMUNE:  NEGATIVE for bleeding problems  PSYCHIATRIC:  NEGATIVE. no anxiety, no depression.      Exam:  Vitals: Resp 18  Ht 1.676 m (5' 6\")  Wt 104.3 kg (230 lb)  BMI 37.12 kg/m2  BMI= Body mass index is 37.12 kg/(m^2).  Constitutional:  healthy, alert and no distress  Neuro: Alert and Oriented x 3, Gait normal. Sensation grossly WNL.  HEENT:  Atraumatic, EOMI  Neck:  Neck supple with no tenderness.  Psych: Affect normal   Respiratory: Breathing not labored.  Cardiovascular: normal peripheral pulses  Lymph: no adenopathy  Skin: No rashes,worrisome lesions or skin problems  Spine: straight, no straight leg raising pain.  Hips show full range of motion.  There is no tenderness over the sacro-iliac joints, sciatic notch, or greater trochanters.       Again, thank you for allowing me to participate in the care of your patient.        Sincerely,    Jose Sullivan MD    "

## 2017-03-27 NOTE — MR AVS SNAPSHOT
After Visit Summary   3/27/2017    Mckenna Campbell    MRN: [id number]           Patient Information     Date Of Birth          1965        Visit Information        Provider Department      3/27/2017 1:45 PM Jose Sullivan MD [facility]        Today's Diagnoses     Left knee pain, unspecified chronicity    -  1      Care Instructions    Diagnosis  Chondromalacia patella.  Glucosamine 1500 mg/day and chondroitin sulfate 1200 mg/day advised.  Avoid kneeling and crawling.  Do quadriceps strengthening (especially VMO) .  Do hip abduction strengthening.  Use anti-inflammatories as needed.                    Patellofemoral Pain Syndrome (Runner's Knee)             What is patellofemoral pain syndrome?   Patellofemoral pain syndrome is pain behind the kneecap. It may also be called patellofemoral disorder, patellar malalignment, runner's knee, and chondromalacia.   How does it occur?   Patellofemoral pain syndrome can occur from overuse of the knee in sports and activities such as running, walking, jumping, or bicycling.   The kneecap (patella) is attached to the large group of muscles in the thigh called the quadriceps. It is also attached to the shin bone by the patellar tendon. The kneecap fits into grooves in the end of the thigh bone (femur) called the femoral condyle. With repeated bending and straightening of the knee, you can irritate the inside surface of the kneecap and cause pain.   Patellofemoral pain syndrome also may result from the way your hips, legs, knees, or feet are aligned. For example, if you have wide hips or underdeveloped thigh muscles, or if you are knock-kneed You may also have this problem if your foot flattens too much when you walk or run (a condition called over-pronation).   What are the symptoms?   The main symptom is pain behind the kneecap. You may have pain when you walk, run, or sit for a long time. The pain is usually worse when you walk  downhill or down stairs. Your knee may swell at times. You may feel or hear snapping, popping, or grinding in the knee.   How is it diagnosed?   Your healthcare provider will review your symptoms and examine your knee. You will have knee X-rays. You may have an MRI to check for damage to the surface of the patella or femur or another injury.   How is it treated?   Treatment includes the following:   Put an ice pack, gel pack, or package of frozen vegetables, wrapped in a cloth on the area every 3 to 4 hours, for up to 20 minutes at a time.   Raise the knee on a pillow when you sit or lie down.   Take an anti-inflammatory medicine such as ibuprofen, or other medicine as directed by your provider. Nonsteroidal anti-inflammatory medicines (NSAIDs) may cause stomach bleeding and other problems. These risks increase with age. Read the label and take as directed. Unless recommended by your healthcare provider, do not take for more than 10 days.   Follow your provider's instructions for doing exercises to help you recover. Your healthcare provider will show you exercises to help decrease the pain behind your kneecap.   If you over-pronate, your healthcare provider may recommend shoe inserts, called orthotics. You can buy orthotics at a pharmacy or athletic shoe store or they can be custom-made.   Use an infrapatellar strap, a strap placed below the kneecap over the patellar tendon.   Wear a neoprene knee sleeve, which will give support to your knee and patella.   While you recover from your injury, you will need to change your sport or activity to one that does not make your condition worse. For example, you may need to bicycle or swim instead of run.   In cases of severe patellofemoral pain syndrome, surgery may be recommended.   How long will the effects last?   Patellofemoral pain often lasts a long time and can come back after symptoms were better for a while. Treatment requires proper rehabilitation exercises that are  done regularly.   When can I return to my normal activities?   Everyone recovers from an injury at a different rate. Return to your activities depends on how soon your knee recovers, not by how many days or weeks it has been since your injury has occurred. In general, the longer you have symptoms before you start treatment, the longer it will take to get better. The goal is to return you to your normal activities as soon as is safely possible. If you return too soon you may worsen your injury.   You may safely return to your normal activities when, starting from the top of the list and progressing to the end, each of the following is true:   Your injured knee can be fully straightened and bent without pain.   Your knee and leg have regained normal strength compared to the uninjured knee and leg.   You are able to walk, bend, and squat without pain.   How can I prevent runner's knee?   Runner's knee can best be prevented by strengthening your thigh muscles, particularly the inside part of this muscle group. It is also important to wear shoes that fit well and that have good arch supports.     Published by [employer].  This content is reviewed periodically and is subject to change as new health information becomes available. The information is intended to inform and educate and is not a replacement for medical evaluation, advice, diagnosis or treatment by a healthcare professional.   Written by Moshe Carpenter MD, for [employer]   ? 2010 [employer] and/or its affiliates. All Rights Reserved.   Copyright   [employer] Reference Systems 2011  Adult Health Advisor    Patellofemoral Pain Syndrome (Runner's Knee) Rehabilitation Exercises              You can do the hamstring stretch right away. When the pain in your knee has decreased, you can do the quadriceps stretch and start strengthening the thigh muscles using the rest of the exercises.   Standing hamstring stretch: Put the heel of the leg on your injured side on a  stool about 15 inches high. Keep your leg straight. Lean forward, bending at the hips, until you feel a mild stretch in the back of your thigh. Make sure you don't roll your shoulders or bend at the waist when doing this or you will stretch your lower back instead of your leg. Hold the stretch for 15 to 30 seconds. Repeat 3 times.   Quadriceps stretch: Stand an arm's length away from the wall with your injured leg farthest from the wall. Facing straight ahead, brace yourself by keeping one hand against the wall. With your other hand, grasp the ankle of your injured leg and pull your heel toward your buttocks. Don't arch or twist your back. Keep your knees together. Hold this stretch for 15 to 30 seconds.   Side-lying leg lift: Lie on your uninjured side. Tighten the front thigh muscles on your injured leg and lift that leg 8 to 10 inches away from the other leg. Keep the leg straight and lower it slowly. Do 3 sets of 10.   Quad sets: Sit on the floor with your injured leg straight and your other leg bent. Press the back of the knee of your injured leg against the floor by tightening the muscles on the top of your thigh. Hold this position 10 seconds. Relax. Do 3 sets of 10.   Straight leg raise: Lie on your back with your legs straight out in front of you. Bend the knee on your uninjured side and place the foot flat on the floor. Tighten the thigh muscle on your injured side and lift your leg about 8 inches off the floor. Keep your leg straight and your thigh muscle tight. Slowly lower your leg back down to the floor. Do 3 sets of 10.   Step-up: Stand with the foot of your injured leg on a support 3 to 5 inches high (like a small step or block of wood). Keep your other foot flat on the floor. Shift your weight onto the injured leg on the support. Straighten your injured leg as the other leg comes off the floor. Return to the starting position by bending your injured leg and slowly lowering your uninjured leg back to  the floor. Do 3 sets of 10.   Wall squat with a ball: Stand with your back, shoulders, and head against a wall. Look straight ahead. Keep your shoulders relaxed and your feet 3 feet from the wall and shoulder's width apart. Place a soccer or basketball-sized ball behind your back. Keeping your back against the wall, slowly squat down to a 45-degree angle. Your thighs will not yet be parallel to the floor. Hold this position for 10 seconds and then slowly slide back up the wall. Repeat 10 times. Build up to 3 sets of 10.   Knee stabilization: Wrap a piece of elastic tubing around the ankle of the uninjured leg. Tie a knot in the other end of the tubing and close it in a door.   0. Stand facing the door on the leg without tubing and bend your knee slightly, keeping your thigh muscles tight. While maintaining this position, move the leg with the tubing straight back behind you. Do 3 sets of 10.   0. Turn 90 degrees so the leg without tubing is closest to the door. Move the leg with tubing away from your body. Do 3 sets of 10.   0. Turn 90 degrees again so your back is to the door. Move the leg with tubing straight out in front of you. Do 3 sets of 10.   0. Turn your body 90 degrees again so the leg with tubing is closest to the door. Move the leg with tubing across your body. Do 3 sets of 10.   Hold onto a chair if you need help balancing. This exercise can be made even more challenging by standing on a pillow while you move the leg with tubing.   Resisted terminal knee extension: Make a loop from a piece of elastic tubing by tying a knot in both ends. Close both knots in a door. Step into the loop so the tubing is around the back of your injured leg. Lift the other foot off the ground. Hold onto a chair for balance, if needed. Bend the knee on the leg with tubing about 45 degrees. Slowly straighten your leg, keeping your thigh muscle tight as you do this. Do this 10 times. Do 3 sets. An easier way to do this is to  stand on both legs for better support while you do the exercise.   Standing calf stretch: Stand facing a wall with your hands on the wall at about eye level. Keep your injured leg back with your heel on the floor. Keep the other leg forward with the knee bent. Turn your back foot slightly inward (as if you were pigeon-toed). Slowly lean into the wall until you feel a stretch in the back of your calf. Hold the stretch for 15 to 30 seconds. Return to the starting position. Repeat 3 times. Do this exercise several times each day.   Clam exercise: Lie on your uninjured side with your hips and knees bent and feet together. Slowly raise your top leg toward the ceiling while keeping your heels touching each other. Hold for 2 seconds and lower slowly. Do 3 sets of 10 repetitions.   Iliotibial band stretch: Side-bending: Cross one leg in front of the other leg and lean in the opposite direction from the front leg. Reach the arm on the side of the back leg over your head while you do this. Hold this position for 15 to 30 seconds. Return to the starting position. Repeat 3 times and then switch legs and repeat the exercise.   Published by [employer].  This content is reviewed periodically and is subject to change as new health information becomes available. The information is intended to inform and educate and is not a replacement for medical evaluation, advice, diagnosis or treatment by a healthcare professional.   Written by Melanie Talbot MS, PT, and Aleisha Douglas PT, [facility], [facility], for [employer].   ? 2010 [facility] and/or its affiliates. All Rights Reserved.   Copyright   Clinical Reference Systems 2011  Adult Health Advisor                                                    Follow-ups after your visit        Who to contact     If you have questions or need follow up information about today's clinic visit or your schedule please contact [facility] directly at [phone].  Normal or non-critical lab and  "imaging results will be communicated to you by MyChart, letter or phone within 4 business days after the clinic has received the results. If you do not hear from us within 7 days, please contact the clinic through [employer] or phone. If you have a critical or abnormal lab result, we will notify you by phone as soon as possible.  Submit refill requests through [employer] or call your pharmacy and they will forward the refill request to us. Please allow 3 business days for your refill to be completed.          Additional Information About Your Visit        "[employer] Information     [employer] gives you secure access to your electronic health record. If you see a primary care provider, you can also send messages to your care team and make appointments. If you have questions, please call your primary care clinic.  If you do not have a primary care provider, please call [phone] and they will assist you.        Care EveryWhere ID     This is your Care EveryWhere ID. This could be used by other organizations to access your [place] medical records  [id number]        Your Vitals Were     Respirations Height BMI (Body Mass Index)             18 1.676 m (5' 6\") 37.12 kg/m2          Blood Pressure from Last 3 Encounters:   02/08/17 (!) 147/98   01/16/17 (!) 150/100   03/30/16 145/90    Weight from Last 3 Encounters:   03/27/17 104.3 kg (230 lb)   02/08/17 104 kg (229 lb 3.2 oz)   01/16/17 105.1 kg (231 lb 9.6 oz)               Primary Care Provider Office Phone # Fax #    Negro Pérez [phone]       [facility] [address] [address]        Thank you!     Thank you for choosing [facility]  for your care. Our goal is always to provide you with excellent care. Hearing back from our patients is one way we can continue to improve our services. Please take a few minutes to complete the written survey that you may receive in the mail after your visit with us. Thank you!      "        Your Updated Medication List - Protect others around you: Learn how to safely use, store and throw away your medicines at www.disposemymeds.org.          This list is accurate as of: 3/27/17  2:43 PM.  Always use your most recent med list.                   Brand Name Dispense Instructions for use    diclofenac 1 % Gel topical gel    VOLTAREN    100 g    Apply 4 grams to knees or 2 grams to hands four times daily using enclosed dosing card.       ibuprofen 200 MG tablet    ADVIL/MOTRIN     Take 2-3 tablets (400-600 mg) by mouth 3 times daily as needed for pain (take with food)       megestrol 20 MG tablet    MEGACE         order for DME     1 Device    Equipment being ordered: tennis elbow strap       PRISTIQ 100 MG 24 hr tablet   Generic drug:  desvenlafaxine succinate ER          TRIGLIDE 160 MG tablet   Generic drug:  fenofibrate      Take 160 mg by mouth daily. 1 tab QD

## 2017-03-28 PROBLEM — M22.42 CHONDROMALACIA PATELLAE, LEFT: Status: ACTIVE | Noted: 2017-03-28

## 2017-03-28 NOTE — PROGRESS NOTES
Mckenna Campbell is a 51 year old female who is seen as self referral for left knee pain.    Past Medical History:   Diagnosis Date     Kidney stone 9/2015       Past Surgical History:   Procedure Laterality Date     CHOLECYSTECTOMY  3/2014     LITHOTRIPSY  9/2015       Family History   Problem Relation Age of Onset     Depression Mother      Hypertension Mother      Hyperlipidemia Father      Breast Cancer Other      Other Cancer Other      DIABETES No family hx of      Coronary Artery Disease No family hx of      Colon Cancer No family hx of        Social History     Social History     Marital status:      Spouse name: N/A     Number of children: N/A     Years of education: N/A     Occupational History     Customer service Unemployed     Social History Main Topics     Smoking status: Never Smoker     Smokeless tobacco: Not on file     Alcohol use 0.0 oz/week     0 Standard drinks or equivalent per week      Comment: 1/year     Drug use: No     Sexual activity: Yes     Partners: Male     Other Topics Concern     Not on file     Social History Narrative    ** Merged History Encounter **            Current Outpatient Prescriptions   Medication Sig Dispense Refill     PRISTIQ 100 MG TB24 24 hr tablet        megestrol (MEGACE) 20 MG tablet        order for DME Equipment being ordered: tennis elbow strap 1 Device 0     diclofenac (VOLTAREN) 1 % GEL Apply 4 grams to knees or 2 grams to hands four times daily using enclosed dosing card. 100 g 1     ibuprofen (ADVIL,MOTRIN) 200 MG tablet Take 2-3 tablets (400-600 mg) by mouth 3 times daily as needed for pain (take with food)       fenofibrate (TRIGLIDE) 160 MG tablet Take 160 mg by mouth daily. 1 tab QD           Allergies   Allergen Reactions     Amoxicillin Anaphylaxis     Amoxicillin      Erythromycin      GI intolerance         REVIEW OF SYSTEMS:  CONSTITUTIONAL:  NEGATIVE for fever, chills, change in weight, not feeling tired  SKIN:  NEGATIVE for  "worrisome rashes, no skin lumps, no skin ulcers and no non-healing wounds  EYES:  NEGATIVE for vision changes or irritation.  ENT/MOUTH:  NEGATIVE.  No hearing loss, no hoarseness, no difficulty swallowing.  RESP:  NEGATIVE. No cough or shortness of breath.  CV:  NEGATIVE for chest pain, palpitations or peripheral edema  GI:  NEGATIVE for nausea, abdominal pain, heartburn, or change in bowel habits  :  Negative. No dysuria, no hematuria  MUSCULOSKELETAL:  See HPI above  NEURO:  NEGATIVE . No headaches, no dizziness,  no numbness  ENDOCRINE:  NEGATIVE for temperature intolerance, skin/hair changes  HEME/ALLERGY/IMMUNE:  NEGATIVE for bleeding problems  PSYCHIATRIC:  NEGATIVE. no anxiety, no depression.      Exam:  Vitals: Resp 18  Ht 1.676 m (5' 6\")  Wt 104.3 kg (230 lb)  BMI 37.12 kg/m2  BMI= Body mass index is 37.12 kg/(m^2).  Constitutional:  healthy, alert and no distress  Neuro: Alert and Oriented x 3, Gait normal. Sensation grossly WNL.  HEENT:  Atraumatic, EOMI  Neck:  Neck supple with no tenderness.  Psych: Affect normal   Respiratory: Breathing not labored.  Cardiovascular: normal peripheral pulses  Lymph: no adenopathy  Skin: No rashes,worrisome lesions or skin problems  Spine: straight, no straight leg raising pain.  Hips show full range of motion.  There is no tenderness over the sacro-iliac joints, sciatic notch, or greater trochanters.     "

## 2017-03-28 NOTE — PROGRESS NOTES
DATE OF VISIT:  2017.       HISTORY OF PRESENT ILLNESS:  Mckenna Campbell is a 51-year-old female seen with left knee pain and locking.  She reports that when she is standing in one spot that the knee will lock and she has difficulty moving it.  She also gets sharp shooting pains and the knee cracks.  This has been going on for approximately 2 years now just with walking and standing at work.  She is presently unemployed.  She has had similar problems about 6 years ago and saw Dr. Laboy who felt she had chondromalacia patella.  She now has sharp, shooting, constant pains rated 6/10, better with sitting.  She has not done exercises.  X-rays were performed showing minimal arthritic changes and a slight spurring along the medial femur.      PHYSICAL EXAMINATION:  Shows patellofemoral crepitation, worse on the left than the right.  There is a clicking on the right, but more of a consistent rub and cracking of the left.  She has a positive patellar grind on the left, negative patellar apprehension.  There is mild medial joint tenderness on the left.  No lateral tenderness.  There is no tenderness on the right.  There is no ligamentous laxity of MCL, LCL or cruciates.  No effusions.  She had clicking with Edinson test medially on both knees but no significant pain.  Lateral Edinson's were negative.  Sensation and circulation are intact.  No increased warmth or erythema.      IMPRESSION:  Left knee chondromalacia patella.  We discussed options of avoiding kneeling and crawling, quad strengthening, hip abduction strengthening, use of glucosamine and chondroitin sulfate, and anti-inflammatories as necessary.  She is currently taking ibuprofen.  We may consider steroid injection if this fails.  Return to clinic p.r.n.         TRISTAN NGUYEN MD             D: 2017 07:14   T: 2017 08:36   MT: RAJINDER      Name:     MCKENNA ROMO   MRN:      [id number]        Account:      [id number]   :       1965           Visit Date:   03/27/2017      Document: [id number]

## 2017-06-17 ENCOUNTER — HEALTH MAINTENANCE LETTER (OUTPATIENT)
Age: 52
End: 2017-06-17

## 2018-03-24 ENCOUNTER — OFFICE VISIT (OUTPATIENT)
Dept: URGENT CARE | Facility: URGENT CARE | Age: 53
End: 2018-03-24
Payer: COMMERCIAL

## 2018-03-24 VITALS
BODY MASS INDEX: 37.12 KG/M2 | HEART RATE: 103 BPM | WEIGHT: 230 LBS | TEMPERATURE: 98.4 F | SYSTOLIC BLOOD PRESSURE: 164 MMHG | OXYGEN SATURATION: 98 % | DIASTOLIC BLOOD PRESSURE: 102 MMHG

## 2018-03-24 DIAGNOSIS — J06.9 VIRAL URI WITH COUGH: Primary | ICD-10-CM

## 2018-03-24 DIAGNOSIS — R07.0 THROAT PAIN: ICD-10-CM

## 2018-03-24 LAB
DEPRECATED S PYO AG THROAT QL EIA: NORMAL
FLUAV+FLUBV AG SPEC QL: NEGATIVE
FLUAV+FLUBV AG SPEC QL: NEGATIVE
SPECIMEN SOURCE: NORMAL
SPECIMEN SOURCE: NORMAL

## 2018-03-24 PROCEDURE — 87081 CULTURE SCREEN ONLY: CPT | Performed by: FAMILY MEDICINE

## 2018-03-24 PROCEDURE — 99213 OFFICE O/P EST LOW 20 MIN: CPT | Performed by: FAMILY MEDICINE

## 2018-03-24 PROCEDURE — 87880 STREP A ASSAY W/OPTIC: CPT | Performed by: FAMILY MEDICINE

## 2018-03-24 PROCEDURE — 87804 INFLUENZA ASSAY W/OPTIC: CPT | Mod: 59 | Performed by: FAMILY MEDICINE

## 2018-03-24 RX ORDER — CODEINE PHOSPHATE AND GUAIFENESIN 10; 100 MG/5ML; MG/5ML
1-2 SOLUTION ORAL EVERY 6 HOURS PRN
Qty: 120 ML | Refills: 0 | Status: SHIPPED | OUTPATIENT
Start: 2018-03-24

## 2018-03-24 RX ORDER — ESCITALOPRAM OXALATE 10 MG/1
10 TABLET ORAL
COMMUNITY
Start: 2018-03-02

## 2018-03-24 NOTE — PROGRESS NOTES
SUBJECTIVE:   Mckenna Campbell is a 52 year old female presenting with a chief complaint of No chief complaint on file.      She is an established patient of [facility].    Onset of symptoms was 6 day(s) ago.  Course of illness is variable .    Severity moderate  Current and Associated symptoms: runny nose, stuffy nose, cough - non-productive, sore throat and fatigue  Treatment measures tried include OTC Cough med.  Predisposing factors include None.        Review of Systems   All other systems reviewed and are negative.      Past Medical History:   Diagnosis Date     Kidney stone 9/2015     Family History   Problem Relation Age of Onset     Depression Mother      Hypertension Mother      Hyperlipidemia Father      Breast Cancer Other      Other Cancer Other      DIABETES No family hx of      Coronary Artery Disease No family hx of      Colon Cancer No family hx of      Current Outpatient Prescriptions   Medication Sig Dispense Refill     PRISTIQ 100 MG TB24 24 hr tablet        megestrol (MEGACE) 20 MG tablet        order for DME Equipment being ordered: tennis elbow strap 1 Device 0     diclofenac (VOLTAREN) 1 % GEL Apply 4 grams to knees or 2 grams to hands four times daily using enclosed dosing card. 100 g 1     ibuprofen (ADVIL,MOTRIN) 200 MG tablet Take 2-3 tablets (400-600 mg) by mouth 3 times daily as needed for pain (take with food)       fenofibrate (TRIGLIDE) 160 MG tablet Take 160 mg by mouth daily. 1 tab QD         Social History   Substance Use Topics     Smoking status: Never Smoker     Smokeless tobacco: Not on file     Alcohol use 0.0 oz/week     0 Standard drinks or equivalent per week      Comment: 1/year       OBJECTIVE  BP (!) 164/102 (BP Location: Left arm, Patient Position: Chair, Cuff Size: Adult Large)  Pulse 103  Temp 98.4  F (36.9  C) (Oral)  Wt 230 lb (104.3 kg)  SpO2 98%  BMI 37.12 kg/m2    Physical Exam   Constitutional: She is oriented to person, place, and time. No distress.    HENT:   Head: Normocephalic and atraumatic.   Right Ear: External ear normal.   Left Ear: External ear normal.   Mouth/Throat: No oropharyngeal exudate.   Mild oropharyngeal erythema    Eyes: EOM are normal. Pupils are equal, round, and reactive to light.   Neck: Normal range of motion. Neck supple.   Cardiovascular: Normal rate and regular rhythm.    Pulmonary/Chest: Effort normal and breath sounds normal.   Abdominal: Soft. Bowel sounds are normal.   Musculoskeletal: Normal range of motion.   Neurological: She is alert and oriented to person, place, and time. She has normal reflexes.   Skin: Skin is warm and dry. She is not diaphoretic.       Labs:  Results for orders placed or performed in visit on 03/24/18   Influenza A/B antigen   Result Value Ref Range    Influenza A/B Agn Specimen Nasal     Influenza A Negative NEG^Negative    Influenza B Negative NEG^Negative   Strep, Rapid Screen   Result Value Ref Range    Specimen Description Throat     Rapid Strep A Screen       NEGATIVE: No Group A streptococcal antigen detected by immunoassay, await culture report.           ASSESSMENT:    ICD-10-CM    1. Viral URI with cough J06.9 guaiFENesin-codeine (ROBITUSSIN AC) 100-10 MG/5ML SOLN solution    B97.89    2. Throat pain R07.0 Influenza A/B antigen     Strep, Rapid Screen     Beta strep group A culture         Medical Decision Making:    Differential Diagnosis:  URI Adult/Peds:  Bronchitis-viral and Pneumonia    Serious Comorbid Conditions:  Adult:  None    PLAN:    URI Adult:  Tylenol, Ibuprofen, Fluids, Rest and Cheratussin prescribed for cough control, common side effect discussed including sedation.     Followup:    If not improving or if condition worsens, follow up with your Primary Care Provider        Patient Instructions     Viral Upper Respiratory Illness (Adult)  You have a viral upper respiratory illness (URI), which is another term for the common cold. This illness is contagious during the first few  days. It is spread through the air by coughing and sneezing. It may also be spread by direct contact (touching the sick person and then touching your own eyes, nose, or mouth). Frequent handwashing will decrease risk of spread. Most viral illnesses go away within 7 to 10 days with rest and simple home remedies. Sometimes the illness may last for several weeks. Antibiotics will not kill a virus, and they are generally not prescribed for this condition.    Home care    If symptoms are severe, rest at home for the first 2 to 3 days. When you resume activity, don't let yourself get too tired.    Avoid being exposed to cigarette smoke (yours or others ).    You may use acetaminophen or ibuprofen to control pain and fever, unless another medicine was prescribed. (Note: If you have chronic liver or kidney disease, have ever had a stomach ulcer or gastrointestinal bleeding, or are taking blood-thinning medicines, talk with your healthcare provider before using these medicines.) Aspirin should never be given to anyone under 18 years of age who is ill with a viral infection or fever. It may cause severe liver or brain damage.    Your appetite may be poor, so a light diet is fine. Avoid dehydration by drinking 6 to 8 glasses of fluids per day (water, soft drinks, juices, tea, or soup). Extra fluids will help loosen secretions in the nose and lungs.    Over-the-counter cold medicines will not shorten the length of time you re sick, but they may be helpful for the following symptoms: cough, sore throat, and nasal and sinus congestion. (Note: Do not use decongestants if you have high blood pressure.)  Follow-up care  Follow up with your healthcare provider, or as advised.  When to seek medical advice  Call your healthcare provider right away if any of these occur:    Cough with lots of colored sputum (mucus)    Severe headache; face, neck, or ear pain    Difficulty swallowing due to throat pain    Fever of 100.4 F (38 C)  Call  911, or get immediate medical care  Call emergency services right away if any of these occur:    Chest pain, shortness of breath, wheezing, or difficulty breathing    Coughing up blood    Inability to swallow due to throat pain  Date Last Reviewed: 9/13/2015 2000-2017 [employer]. [address] [place] [address]. All rights reserved. This information is not intended as a substitute for professional medical care. Always follow your healthcare professional's instructions.            Devon Esparza MD  [facility]

## 2018-03-24 NOTE — NURSING NOTE
"Chief Complaint   Patient presents with     URI     cold, cough       Initial BP (!) 164/102 (BP Location: Left arm, Patient Position: Chair, Cuff Size: Adult Large)  Pulse 103  Temp 98.4  F (36.9  C) (Oral)  Wt 230 lb (104.3 kg)  SpO2 98%  BMI 37.12 kg/m2 Estimated body mass index is 37.12 kg/(m^2) as calculated from the following:    Height as of 3/27/17: 5' 6\" (1.676 m).    Weight as of this encounter: 230 lb (104.3 kg).  Medication Reconciliation: complete   Coreen Worthy CMA      "

## 2018-03-24 NOTE — MR AVS SNAPSHOT
After Visit Summary   3/24/2018    Mckenna Campbell    MRN: [id number]           Patient Information     Date Of Birth          1965        Visit Information        Provider Department      3/24/2018 9:10 AM Devon Esparza MD [facility]        Today's Diagnoses     Viral URI with cough    -  1    Throat pain          Care Instructions      Viral Upper Respiratory Illness (Adult)  You have a viral upper respiratory illness (URI), which is another term for the common cold. This illness is contagious during the first few days. It is spread through the air by coughing and sneezing. It may also be spread by direct contact (touching the sick person and then touching your own eyes, nose, or mouth). Frequent handwashing will decrease risk of spread. Most viral illnesses go away within 7 to 10 days with rest and simple home remedies. Sometimes the illness may last for several weeks. Antibiotics will not kill a virus, and they are generally not prescribed for this condition.    Home care    If symptoms are severe, rest at home for the first 2 to 3 days. When you resume activity, don't let yourself get too tired.    Avoid being exposed to cigarette smoke (yours or others ).    You may use acetaminophen or ibuprofen to control pain and fever, unless another medicine was prescribed. (Note: If you have chronic liver or kidney disease, have ever had a stomach ulcer or gastrointestinal bleeding, or are taking blood-thinning medicines, talk with your healthcare provider before using these medicines.) Aspirin should never be given to anyone under 18 years of age who is ill with a viral infection or fever. It may cause severe liver or brain damage.    Your appetite may be poor, so a light diet is fine. Avoid dehydration by drinking 6 to 8 glasses of fluids per day (water, soft drinks, juices, tea, or soup). Extra fluids will help loosen secretions in the nose and  lungs.    Over-the-counter cold medicines will not shorten the length of time you re sick, but they may be helpful for the following symptoms: cough, sore throat, and nasal and sinus congestion. (Note: Do not use decongestants if you have high blood pressure.)  Follow-up care  Follow up with your healthcare provider, or as advised.  When to seek medical advice  Call your healthcare provider right away if any of these occur:    Cough with lots of colored sputum (mucus)    Severe headache; face, neck, or ear pain    Difficulty swallowing due to throat pain    Fever of 100.4 F (38 C)  Call 911, or get immediate medical care  Call emergency services right away if any of these occur:    Chest pain, shortness of breath, wheezing, or difficulty breathing    Coughing up blood    Inability to swallow due to throat pain  Date Last Reviewed: 9/13/2015 2000-2017 [employer]. [address] [place] [address]. All rights reserved. This information is not intended as a substitute for professional medical care. Always follow your healthcare professional's instructions.                Follow-ups after your visit        Who to contact     If you have questions or need follow up information about today's clinic visit or your schedule please contact [facility] directly at [phone].  Normal or non-critical lab and imaging results will be communicated to you by [employer], letter or phone within 4 business days after the clinic has received the results. If you do not hear from us within 7 days, please contact the clinic through MyChart or phone. If you have a critical or abnormal lab result, we will notify you by phone as soon as possible.  Submit refill requests through [employer] or call your pharmacy and they will forward the refill request to us. Please allow 3 business days for your refill to be completed.          Additional Information About Your Visit        MyChart Information      [employer] gives you secure access to your electronic health record. If you see a primary care provider, you can also send messages to your care team and make appointments. If you have questions, please call your primary care clinic.  If you do not have a primary care provider, please call [phone] and they will assist you.        Care EveryWhere ID     This is your Care EveryWhere ID. This could be used by other organizations to access your [facility] medical records  [id number]        Your Vitals Were     Pulse Temperature Pulse Oximetry BMI (Body Mass Index)          103 98.4  F (36.9  C) (Oral) 98% 37.12 kg/m2         Blood Pressure from Last 3 Encounters:   03/24/18 (!) 164/102   02/08/17 (!) 147/98   01/16/17 (!) 150/100    Weight from Last 3 Encounters:   03/24/18 230 lb (104.3 kg)   03/27/17 230 lb (104.3 kg)   02/08/17 229 lb 3.2 oz (104 kg)              We Performed the Following     Beta strep group A culture     Influenza A/B antigen     Strep, Rapid Screen          Today's Medication Changes          These changes are accurate as of 3/24/18  9:56 AM.  If you have any questions, ask your nurse or doctor.               Start taking these medicines.        Dose/Directions    guaiFENesin-codeine 100-10 MG/5ML Soln solution   Commonly known as:  ROBITUSSIN AC   Used for:  Viral URI with cough   Started by:  Devon Esparza MD        Dose:  1-2 tsp.   Take 5-10 mLs by mouth every 6 hours as needed for cough   Quantity:  120 mL   Refills:  0            Where to get your medicines      Some of these will need a paper prescription and others can be bought over the counter.  Ask your nurse if you have questions.     Bring a paper prescription for each of these medications     guaiFENesin-codeine 100-10 MG/5ML Soln solution                Primary Care Provider Office Phone # Fax #    Negro Pérez [phone]       [facility] [address]  [facility] [address]        Equal Access  to Services     SUKHJINDER YATES : Hadii kevyn Christensen, waoscarda luqadaha, qaybta kaalmamarcellus luna, talia lewis. So [facility] [phone].    ATENCIÓN: Si hernesto saldivar, tiene a geiger disposición servicios gratuitos de asistencia lingüística. Llame al [phone].    We comply with applicable federal civil rights laws and [place] laws. We do not discriminate on the basis of race, color, national origin, age, disability, sex, sexual orientation, or gender identity.            Thank you!     Thank you for choosing [facility]  for your care. Our goal is always to provide you with excellent care. Hearing back from our patients is one way we can continue to improve our services. Please take a few minutes to complete the written survey that you may receive in the mail after your visit with us. Thank you!             Your Updated Medication List - Protect others around you: Learn how to safely use, store and throw away your medicines at www.disposemymeds.org.          This list is accurate as of 3/24/18  9:56 AM.  Always use your most recent med list.                   Brand Name Dispense Instructions for use Diagnosis    diclofenac 1 % Gel topical gel    VOLTAREN    100 g    Apply 4 grams to knees or 2 grams to hands four times daily using enclosed dosing card.    Left knee pain       escitalopram 10 MG tablet    LEXAPRO     Take 10 mg by mouth        guaiFENesin-codeine 100-10 MG/5ML Soln solution    ROBITUSSIN AC    120 mL    Take 5-10 mLs by mouth every 6 hours as needed for cough    Viral URI with cough       ibuprofen 200 MG tablet    ADVIL/MOTRIN     Take 2-3 tablets (400-600 mg) by mouth 3 times daily as needed for pain (take with food)    Lumbago       megestrol 20 MG tablet    MEGACE          order for DME     1 Device    Equipment being ordered: tennis elbow strap    Tennis elbow syndrome, left       TRIGLIDE 160 MG tablet   Generic drug:  fenofibrate      Take  160 mg by mouth daily. 1 tab QD

## 2018-03-24 NOTE — PATIENT INSTRUCTIONS

## 2018-03-25 LAB
BACTERIA SPEC CULT: NORMAL
SPECIMEN SOURCE: NORMAL

## 2018-12-31 ENCOUNTER — OFFICE VISIT (OUTPATIENT)
Dept: URGENT CARE | Facility: URGENT CARE | Age: 53
End: 2018-12-31
Payer: COMMERCIAL

## 2018-12-31 VITALS
SYSTOLIC BLOOD PRESSURE: 144 MMHG | WEIGHT: 226 LBS | HEART RATE: 111 BPM | RESPIRATION RATE: 20 BRPM | BODY MASS INDEX: 36.48 KG/M2 | TEMPERATURE: 98.4 F | DIASTOLIC BLOOD PRESSURE: 80 MMHG | OXYGEN SATURATION: 98 %

## 2018-12-31 DIAGNOSIS — J20.8 ACUTE VIRAL BRONCHITIS: Primary | ICD-10-CM

## 2018-12-31 DIAGNOSIS — R07.0 THROAT PAIN: ICD-10-CM

## 2018-12-31 DIAGNOSIS — R05.9 COUGH: ICD-10-CM

## 2018-12-31 LAB
DEPRECATED S PYO AG THROAT QL EIA: NORMAL
SPECIMEN SOURCE: NORMAL

## 2018-12-31 PROCEDURE — 87880 STREP A ASSAY W/OPTIC: CPT | Performed by: PHYSICIAN ASSISTANT

## 2018-12-31 PROCEDURE — 99213 OFFICE O/P EST LOW 20 MIN: CPT | Performed by: PHYSICIAN ASSISTANT

## 2018-12-31 PROCEDURE — 87081 CULTURE SCREEN ONLY: CPT | Performed by: PHYSICIAN ASSISTANT

## 2018-12-31 RX ORDER — ZOLPIDEM TARTRATE 10 MG/1
TABLET ORAL
Refills: 2 | COMMUNITY
Start: 2018-12-11

## 2018-12-31 RX ORDER — ASPIRIN 81 MG/1
81 TABLET ORAL
COMMUNITY
Start: 2018-05-30

## 2018-12-31 RX ORDER — ALBUTEROL SULFATE 90 UG/1
2 AEROSOL, METERED RESPIRATORY (INHALATION) EVERY 4 HOURS PRN
Qty: 1 INHALER | Refills: 0 | Status: SHIPPED | OUTPATIENT
Start: 2018-12-31

## 2018-12-31 RX ORDER — LISINOPRIL AND HYDROCHLOROTHIAZIDE 20; 25 MG/1; MG/1
1 TABLET ORAL DAILY
Refills: 1 | COMMUNITY
Start: 2018-12-17

## 2018-12-31 RX ORDER — INFLUENZA A VIRUS A/GUANGDONG-MAONAN/SWL1536/2019 CNIC-1909 (H1N1) ANTIGEN (FORMALDEHYDE INACTIVATED), INFLUENZA A VIRUS A/HONG KONG/2671/2019 (H3N2) ANTIGEN (FORMALDEHYDE INACTIVATED), INFLUENZA B VIRUS B/PHUKET/3073/2013 ANTIGEN (FORMALDEHYDE INACTIVATED), AND INFLUENZA B VIRUS B/WASHINGTON/02/2019 ANTIGEN (FORMALDEHYDE INACTIVATED) 15; 15; 15; 15 UG/.5ML; UG/.5ML; UG/.5ML; UG/.5ML
INJECTION, SUSPENSION INTRAMUSCULAR
Refills: 0 | COMMUNITY
Start: 2018-08-15

## 2018-12-31 RX ORDER — ZOSTER VACCINE RECOMBINANT, ADJUVANTED 50 MCG/0.5
KIT INTRAMUSCULAR
Refills: 0 | COMMUNITY
Start: 2018-11-28

## 2018-12-31 RX ORDER — BENZONATATE 100 MG/1
100 CAPSULE ORAL 3 TIMES DAILY PRN
Qty: 20 CAPSULE | Refills: 0 | Status: SHIPPED | OUTPATIENT
Start: 2018-12-31 | End: 2019-01-07

## 2018-12-31 NOTE — PATIENT INSTRUCTIONS
Bronchitis  There were no signs of pneumonia on your lung exam. Your Strep throat test was negative.    Your symptoms are most likely due to acute bronchitis.  This is inflammation of the tubes leading into the lungs, most often due to a viral infection.    Take albuterol inhaler 2 puffs every 4-6 hours as needed for cough, wheezing, for up to 2 weeks.    May take Tessalon Perles as needed every 8 hours for cough.    May also continue to use Coricidin for symptoms as well.    Please monitor symptoms carefully.  If developing chest pain, shortness of breath, fever, coughing up blood, extreme fatigue, or any other new, concerning symptoms, come back to clinic or go to ER immediately.  Otherwise, if no improvement in symptoms in 1-2 weeks, follow-up with your primary care provider.    Viral Bronchitis (Adult)    You have a viral bronchitis. Bronchitis is inflammation and swelling of the lining of the lungs. This is often caused by an infection. Symptoms include a dry, hacking cough that is worse at night. The cough may bring up yellow-green mucus. You may also feel short of breath or wheeze. Other symptoms may include tiredness, chest discomfort, and chills.  Bronchitis that is caused by a virus is not treated with antibiotics. Instead, medicines may be given to help relieve symptoms. Symptoms can last up to 2 weeks, although the cough may last much longer.  This illness is contagious during the first few days and is spread through the air by coughing and sneezing, or by direct contact (touching the sick person and then touching your own eyes, nose, or mouth).  Most viral illnesses resolve within 10 to 14 days with rest and simple home remedies, although they may sometimes last for several weeks.  Home care    If symptoms are severe, rest at home for the first 2 to 3 days. When you go back to your usual activities, don't let yourself get too tired.    Do not smoke. Also avoid being exposed to secondhand smoke.    You  may use over-the-counter medicine to control fever or pain, unless another pain medicine was prescribed. If you have chronic liver or kidney disease or have ever had a stomach ulcer or gastrointestinal bleeding, talk with your healthcare provider before using these medicines. Also talk to your provider if you are taking medicine to prevent blood clots. Aspirin should never be given to anyone younger than 18 years of age who is ill with a viral infection or fever. It may cause severe liver or brain damage.    Your appetite may be poor, so a light diet is fine. Avoid dehydration by drinking 6 to 8 glasses of fluids per day (such as water, soft drinks, sports drinks, juices, tea, or soup). Extra fluids will help loosen secretions in the nose and lungs.    Over-the-counter cough, cold, and sore-throat medicines will not shorten the length of the illness, but they may help to reduce symptoms. Don't use decongestants if you have high blood pressure.  Follow-up care  Follow up with your healthcare provider, or as advised. If you had an X-ray or ECG (electrocardiogram), a specialist will review it. You will be notified of any new findings that may affect your care.  If you are age 65 or older, or if you have a chronic lung disease or condition that affects your immune system, or you smoke, ask your healthcare provider about getting a pneumococcal vaccine and a yearly flu shot (influenza vaccine).  When to seek medical advice  Call your healthcare provider right away if any of these occur:    Fever of 100.4 F (38 C) or higher, or as directed by your healthcare provider    Coughing up increased amounts of colored sputum    Weakness, drowsiness, headache, facial pain, ear pain, or a stiff neck  Call 911  Call 911 if any of these occur:    Coughing up blood    Worsening weakness, drowsiness, headache, or stiff neck    Trouble breathing, wheezing, or pain with breathing  Date Last Reviewed: 6/1/2018 2000-2018 [facility]  [employer]. [address] [place] [address], [place] [address]. All rights reserved. This information is not intended as a substitute for professional medical care. Always follow your healthcare professional's instructions.

## 2018-12-31 NOTE — PROGRESS NOTES
"SUBJECTIVE:   Mckenna Campbell is a 53 year old female presenting with a chief complaint of   Chief Complaint   Patient presents with     URI     Symptoms present since this past Thursday, cough is now hurting her chest when she coughs, no sore throat, no fever/chills   .    URI Adult    Onset of symptoms was 3 day(s) ago.  Course of illness is worsening.    Severity moderately severe  Current and Associated symptoms: States it started with a cold with sore throat and stuffy nose. Then progressed to cough. Cough is getting worse. Cough is dry. She complains of central chest pain with coughing. She feels wheezy. No SOB. No hemoptysis. No leg swelling. No fever.   No nausea, vomiting. Admits to \"HA from coughing.\" No blurry vision. No rashes.  Treatment measures tried include: Coricidin, benadryl  Predisposing factors include: no known ill contacts. No PMH asthma or lung disease.    ROS  See HPI    PMH:  Past Medical History:   Diagnosis Date     Kidney stone 9/2015     Patient Active Problem List   Diagnosis     Hyperlipidemia LDL goal <130     DJD (degenerative joint disease), lumbar     CARDIOVASCULAR SCREENING; LDL GOAL LESS THAN 160     Elevated blood pressure reading without diagnosis of hypertension     Non morbid obesity due to excess calories     Psychophysiological insomnia     Chondromalacia patellae, left       Current Medications:  Current Outpatient Medications   Medication Sig Dispense Refill     albuterol (VENTOLIN HFA) 108 (90 Base) MCG/ACT inhaler Inhale 2 puffs into the lungs every 4 hours as needed for shortness of breath / dyspnea or wheezing 1 Inhaler 0     aspirin 81 MG EC tablet Take 81 mg by mouth       benzonatate (TESSALON) 100 MG capsule Take 1 capsule (100 mg) by mouth 3 times daily as needed for cough 20 capsule 0     diclofenac (VOLTAREN) 1 % GEL Apply 4 grams to knees or 2 grams to hands four times daily using enclosed dosing card. 100 g 1     escitalopram (LEXAPRO) 10 MG tablet " Take 10 mg by mouth       ibuprofen (ADVIL,MOTRIN) 200 MG tablet Take 2-3 tablets (400-600 mg) by mouth 3 times daily as needed for pain (take with food)       lisinopril-hydrochlorothiazide (PRINZIDE/ZESTORETIC) 20-25 MG tablet Take 1 tablet by mouth daily  1     megestrol (MEGACE) 20 MG tablet        zolpidem (AMBIEN) 10 MG tablet TAKE 1 TABLET BY MOUTH EVERY DAY AT BEDTIME AS NEEDED FOR SLEEP  2     fenofibrate (TRIGLIDE) 160 MG tablet Take 160 mg by mouth daily. 1 tab QD         FLUZONE QUADRIVALENT 0.5 ML injection TO BE ADMINISTERED BY PHARMACIST FOR IMMUNIZATION  0     guaiFENesin-codeine (ROBITUSSIN AC) 100-10 MG/5ML SOLN solution Take 5-10 mLs by mouth every 6 hours as needed for cough (Patient not taking: Reported on 12/31/2018) 120 mL 0     order for DME Equipment being ordered: tennis elbow strap (Patient not taking: Reported on 3/24/2018) 1 Device 0     SHINGRIX injection TO BE ADMINISTERED BY PHARMACIST FOR IMMUNIZATION  0       Surgical History:  Past Surgical History:   Procedure Laterality Date     CHOLECYSTECTOMY  3/2014     LITHOTRIPSY  9/2015       Family History:  Family History   Problem Relation Age of Onset     Depression Mother      Hypertension Mother      Hyperlipidemia Father      Breast Cancer Other      Other Cancer Other      Diabetes No family hx of      Coronary Artery Disease No family hx of      Colon Cancer No family hx of        Social History:  Social History     Tobacco Use     Smoking status: Never Smoker     Smokeless tobacco: Never Used   Substance Use Topics     Alcohol use: Yes     Alcohol/week: 0.0 oz     Comment: 1/year          OBJECTIVE    Vitals:    12/31/18 0931 12/31/18 1003   BP: (!) 157/107 144/80   BP Location: Left arm Right arm   Patient Position: Chair Chair   Cuff Size: Adult Large Adult Large   Pulse: 111    Resp: 20    Temp: 98.4  F (36.9  C)    TempSrc: Oral    SpO2: 98%    Weight: 102.5 kg (226 lb)        General: alert, appears generally well, NAD.  Afebrile. Talkative.  Skin: no suspicious lesions or rashes.  HEENT: Normocephalic.   Eyes: conjunctiva clear.   Ears: TMs pearly, translucent bilaterally.   Nose: no nasal polyps. No edema of nasal mucosa. No rhinorrhea.  Oropharynx: MMM. ++ posterior pharyngeal erythema, mildly erythematous papules on posterior soft palate. No exudate. No tonsillar hypertrophy. Uvula midline.    Neck: supple, + submandibular lymphadenopathy.  Respiratory: No distress. Equal inspiration to bilateral bases. No crackles wheeze, rhonchi, rales.   Cardiovascular: RRR. No murmurs, clicks, gallups, or rub.   Psych: normal mood and affect.    Labs:  Results for orders placed or performed in visit on 12/31/18 (from the past 24 hour(s))   Rapid strep screen   Result Value Ref Range    Specimen Description Throat     Rapid Strep A Screen       NEGATIVE: No Group A streptococcal antigen detected by immunoassay, await culture report.         X-Ray was not done.      ASSESSMENT/PLAN:    ICD-10-CM    1. Acute viral bronchitis J20.8 albuterol (VENTOLIN HFA) 108 (90 Base) MCG/ACT inhaler   2. Throat pain R07.0 Rapid strep screen   3. Cough R05 benzonatate (TESSALON) 100 MG capsule           Medical Decision Making:    Serious Comorbid Conditions: HTN, hyperlipidemia    Differential Diagnosis:  -viral URI  -viral bronchitis  -Strep    RST negative today.   Lungs clear- no signs of pneumonia. No fever- do not suspect influenza or pneumonia.   Symptoms most suggestive of viral bronchitis.  Prescribed albuterol inhaler 2 puffs  q4h prn for cough, wheezing.  Tessalon perles prescribed.  F/up with PCP if no improvement in 1 week, sooner if worsening.    At the end of the encounter, I discussed all available results, as well as the diagnosis and any associated medications. I discussed red flags for immediate return to clinic/ER, as well as indications for follow up. Refer to patient instructions below, which were all addressed with patient. Patient  understood and agreed to plan. Patient was appropriate for discharge.      Patient Instructions   Bronchitis  There were no signs of pneumonia on your lung exam. Your Strep throat test was negative.    Your symptoms are most likely due to acute bronchitis.  This is inflammation of the tubes leading into the lungs, most often due to a viral infection.    Take albuterol inhaler 2 puffs every 4-6 hours as needed for cough, wheezing, for up to 2 weeks.    May take Tessalon Perles as needed every 8 hours for cough.    May also continue to use Coricidin for symptoms as well.    Please monitor symptoms carefully.  If developing chest pain, shortness of breath, fever, coughing up blood, extreme fatigue, or any other new, concerning symptoms, come back to clinic or go to ER immediately.  Otherwise, if no improvement in symptoms in 1-2 weeks, follow-up with your primary care provider.    Viral Bronchitis (Adult)    You have a viral bronchitis. Bronchitis is inflammation and swelling of the lining of the lungs. This is often caused by an infection. Symptoms include a dry, hacking cough that is worse at night. The cough may bring up yellow-green mucus. You may also feel short of breath or wheeze. Other symptoms may include tiredness, chest discomfort, and chills.  Bronchitis that is caused by a virus is not treated with antibiotics. Instead, medicines may be given to help relieve symptoms. Symptoms can last up to 2 weeks, although the cough may last much longer.  This illness is contagious during the first few days and is spread through the air by coughing and sneezing, or by direct contact (touching the sick person and then touching your own eyes, nose, or mouth).  Most viral illnesses resolve within 10 to 14 days with rest and simple home remedies, although they may sometimes last for several weeks.  Home care    If symptoms are severe, rest at home for the first 2 to 3 days. When you go back to your usual activities, don't  let yourself get too tired.    Do not smoke. Also avoid being exposed to secondhand smoke.    You may use over-the-counter medicine to control fever or pain, unless another pain medicine was prescribed. If you have chronic liver or kidney disease or have ever had a stomach ulcer or gastrointestinal bleeding, talk with your healthcare provider before using these medicines. Also talk to your provider if you are taking medicine to prevent blood clots. Aspirin should never be given to anyone younger than 18 years of age who is ill with a viral infection or fever. It may cause severe liver or brain damage.    Your appetite may be poor, so a light diet is fine. Avoid dehydration by drinking 6 to 8 glasses of fluids per day (such as water, soft drinks, sports drinks, juices, tea, or soup). Extra fluids will help loosen secretions in the nose and lungs.    Over-the-counter cough, cold, and sore-throat medicines will not shorten the length of the illness, but they may help to reduce symptoms. Don't use decongestants if you have high blood pressure.  Follow-up care  Follow up with your healthcare provider, or as advised. If you had an X-ray or ECG (electrocardiogram), a specialist will review it. You will be notified of any new findings that may affect your care.  If you are age 65 or older, or if you have a chronic lung disease or condition that affects your immune system, or you smoke, ask your healthcare provider about getting a pneumococcal vaccine and a yearly flu shot (influenza vaccine).  When to seek medical advice  Call your healthcare provider right away if any of these occur:    Fever of 100.4 F (38 C) or higher, or as directed by your healthcare provider    Coughing up increased amounts of colored sputum    Weakness, drowsiness, headache, facial pain, ear pain, or a stiff neck  Call 911  Call 911 if any of these occur:    Coughing up blood    Worsening weakness, drowsiness, headache, or stiff neck    Trouble  breathing, wheezing, or pain with breathing  Date Last Reviewed: 6/1/2018 2000-2018 [employer]. [address] [place] [address], [place] [address]. All rights reserved. This information is not intended as a substitute for professional medical care. Always follow your healthcare professional's instructions.                        Debo Lee PA-C

## 2019-12-15 ENCOUNTER — HEALTH MAINTENANCE LETTER (OUTPATIENT)
Age: 54
End: 2019-12-15

## 2021-01-15 ENCOUNTER — HEALTH MAINTENANCE LETTER (OUTPATIENT)
Age: 56
End: 2021-01-15

## 2021-10-24 ENCOUNTER — HEALTH MAINTENANCE LETTER (OUTPATIENT)
Age: 56
End: 2021-10-24

## 2021-12-19 ENCOUNTER — HEALTH MAINTENANCE LETTER (OUTPATIENT)
Age: 56
End: 2021-12-19

## 2022-02-13 ENCOUNTER — HEALTH MAINTENANCE LETTER (OUTPATIENT)
Age: 57
End: 2022-02-13

## 2022-10-16 ENCOUNTER — HEALTH MAINTENANCE LETTER (OUTPATIENT)
Age: 57
End: 2022-10-16

## 2023-03-26 ENCOUNTER — HEALTH MAINTENANCE LETTER (OUTPATIENT)
Age: 58
End: 2023-03-26

## 2024-01-13 ENCOUNTER — HEALTH MAINTENANCE LETTER (OUTPATIENT)
Age: 59
End: 2024-01-13